# Patient Record
Sex: MALE | Race: WHITE | NOT HISPANIC OR LATINO | Employment: FULL TIME | URBAN - METROPOLITAN AREA
[De-identification: names, ages, dates, MRNs, and addresses within clinical notes are randomized per-mention and may not be internally consistent; named-entity substitution may affect disease eponyms.]

---

## 2017-02-21 ENCOUNTER — AMBULATORY - HEALTHEAST (OUTPATIENT)
Dept: ONCOLOGY | Facility: HOSPITAL | Age: 37
End: 2017-02-21

## 2017-05-30 ENCOUNTER — OFFICE VISIT - HEALTHEAST (OUTPATIENT)
Dept: FAMILY MEDICINE | Facility: CLINIC | Age: 37
End: 2017-05-30

## 2017-05-30 DIAGNOSIS — R05.9 COUGH: ICD-10-CM

## 2017-05-30 DIAGNOSIS — J20.9 ACUTE BRONCHITIS: ICD-10-CM

## 2017-05-30 DIAGNOSIS — R50.9 FEVER: ICD-10-CM

## 2017-05-30 DIAGNOSIS — J06.9 VIRAL URI: ICD-10-CM

## 2017-05-30 DIAGNOSIS — R07.0 THROAT PAIN: ICD-10-CM

## 2017-07-17 ENCOUNTER — OFFICE VISIT - HEALTHEAST (OUTPATIENT)
Dept: FAMILY MEDICINE | Facility: CLINIC | Age: 37
End: 2017-07-17

## 2017-07-17 DIAGNOSIS — R19.7 DIARRHEA OF PRESUMED INFECTIOUS ORIGIN: ICD-10-CM

## 2017-07-17 RX ORDER — LORAZEPAM 0.5 MG/1
1 TABLET ORAL 3 TIMES DAILY
Status: SHIPPED | COMMUNITY
Start: 2017-07-17 | End: 2021-12-15

## 2017-07-17 RX ORDER — VENLAFAXINE 75 MG/1
75 TABLET ORAL 2 TIMES DAILY
Status: SHIPPED | COMMUNITY
Start: 2017-07-17 | End: 2022-05-06

## 2017-07-17 ASSESSMENT — MIFFLIN-ST. JEOR: SCORE: 1914.6

## 2017-07-18 ENCOUNTER — COMMUNICATION - HEALTHEAST (OUTPATIENT)
Dept: FAMILY MEDICINE | Facility: CLINIC | Age: 37
End: 2017-07-18

## 2017-07-20 ENCOUNTER — COMMUNICATION - HEALTHEAST (OUTPATIENT)
Dept: FAMILY MEDICINE | Facility: CLINIC | Age: 37
End: 2017-07-20

## 2017-07-20 ENCOUNTER — OFFICE VISIT - HEALTHEAST (OUTPATIENT)
Dept: FAMILY MEDICINE | Facility: CLINIC | Age: 37
End: 2017-07-20

## 2017-07-20 DIAGNOSIS — R19.7 DIARRHEA: ICD-10-CM

## 2017-07-20 DIAGNOSIS — K76.89 OTHER CHRONIC NONALCOHOLIC LIVER DISEASE: ICD-10-CM

## 2017-07-20 ASSESSMENT — MIFFLIN-ST. JEOR: SCORE: 1904.16

## 2017-07-21 ENCOUNTER — HOSPITAL ENCOUNTER (OUTPATIENT)
Dept: ULTRASOUND IMAGING | Facility: HOSPITAL | Age: 37
Discharge: HOME | End: 2017-07-21
Attending: FAMILY MEDICINE

## 2017-07-21 DIAGNOSIS — R19.7 DIARRHEA: ICD-10-CM

## 2017-07-21 DIAGNOSIS — K76.89 OTHER CHRONIC NONALCOHOLIC LIVER DISEASE: ICD-10-CM

## 2017-07-27 ENCOUNTER — COMMUNICATION - HEALTHEAST (OUTPATIENT)
Dept: FAMILY MEDICINE | Facility: CLINIC | Age: 37
End: 2017-07-27

## 2017-07-28 ENCOUNTER — OFFICE VISIT - HEALTHEAST (OUTPATIENT)
Dept: FAMILY MEDICINE | Facility: CLINIC | Age: 37
End: 2017-07-28

## 2017-07-28 DIAGNOSIS — K82.8 GALLBLADDER SLUDGE: ICD-10-CM

## 2017-07-28 DIAGNOSIS — K76.89 OTHER CHRONIC NONALCOHOLIC LIVER DISEASE: ICD-10-CM

## 2017-10-04 ENCOUNTER — RECORDS - HEALTHEAST (OUTPATIENT)
Dept: ADMINISTRATIVE | Facility: OTHER | Age: 37
End: 2017-10-04

## 2017-10-27 ENCOUNTER — RECORDS - HEALTHEAST (OUTPATIENT)
Dept: ADMINISTRATIVE | Facility: OTHER | Age: 37
End: 2017-10-27

## 2017-11-06 ENCOUNTER — COMMUNICATION - HEALTHEAST (OUTPATIENT)
Dept: FAMILY MEDICINE | Facility: CLINIC | Age: 37
End: 2017-11-06

## 2017-11-06 DIAGNOSIS — Z23 NEED FOR HEPATITIS B VACCINATION: ICD-10-CM

## 2018-03-07 ENCOUNTER — OFFICE VISIT - HEALTHEAST (OUTPATIENT)
Dept: FAMILY MEDICINE | Facility: CLINIC | Age: 38
End: 2018-03-07

## 2018-03-07 DIAGNOSIS — R10.32 GROIN PAIN, LEFT: ICD-10-CM

## 2018-03-07 RX ORDER — DEXTROAMPHETAMINE SACCHARATE, AMPHETAMINE ASPARTATE, DEXTROAMPHETAMINE SULFATE AND AMPHETAMINE SULFATE 2.5; 2.5; 2.5; 2.5 MG/1; MG/1; MG/1; MG/1
10 TABLET ORAL 3 TIMES DAILY
Status: SHIPPED | COMMUNITY
Start: 2018-03-07 | End: 2021-12-15

## 2018-04-24 ENCOUNTER — OFFICE VISIT - HEALTHEAST (OUTPATIENT)
Dept: FAMILY MEDICINE | Facility: CLINIC | Age: 38
End: 2018-04-24

## 2018-04-24 DIAGNOSIS — J40 BRONCHITIS: ICD-10-CM

## 2018-04-24 ASSESSMENT — MIFFLIN-ST. JEOR: SCORE: 1929.39

## 2018-04-26 ENCOUNTER — OFFICE VISIT - HEALTHEAST (OUTPATIENT)
Dept: FAMILY MEDICINE | Facility: CLINIC | Age: 38
End: 2018-04-26

## 2018-04-26 DIAGNOSIS — R00.0 TACHYCARDIA: ICD-10-CM

## 2018-04-26 DIAGNOSIS — R05.9 COUGH: ICD-10-CM

## 2018-04-26 DIAGNOSIS — R09.02 HYPOXIA: ICD-10-CM

## 2018-04-26 DIAGNOSIS — R07.81 PLEURITIC CHEST PAIN: ICD-10-CM

## 2018-04-26 ASSESSMENT — MIFFLIN-ST. JEOR: SCORE: 1924.29

## 2018-05-08 ENCOUNTER — COMMUNICATION - HEALTHEAST (OUTPATIENT)
Dept: FAMILY MEDICINE | Facility: CLINIC | Age: 38
End: 2018-05-08

## 2018-05-14 ENCOUNTER — OFFICE VISIT - HEALTHEAST (OUTPATIENT)
Dept: FAMILY MEDICINE | Facility: CLINIC | Age: 38
End: 2018-05-14

## 2018-05-14 DIAGNOSIS — J20.9 ACUTE BRONCHITIS: ICD-10-CM

## 2018-07-09 ENCOUNTER — OFFICE VISIT - HEALTHEAST (OUTPATIENT)
Dept: FAMILY MEDICINE | Facility: CLINIC | Age: 38
End: 2018-07-09

## 2018-07-09 DIAGNOSIS — Z00.00 ROUTINE GENERAL MEDICAL EXAMINATION AT A HEALTH CARE FACILITY: ICD-10-CM

## 2018-07-09 DIAGNOSIS — E78.5 HYPERLIPIDEMIA: ICD-10-CM

## 2018-07-09 DIAGNOSIS — D69.6 THROMBOCYTOPENIA (H): ICD-10-CM

## 2018-07-09 DIAGNOSIS — K76.0 FATTY LIVER: ICD-10-CM

## 2018-07-09 DIAGNOSIS — Z23 NEED FOR VACCINATION: ICD-10-CM

## 2018-07-09 DIAGNOSIS — Z13.220 LIPID SCREENING: ICD-10-CM

## 2018-07-09 LAB
ALBUMIN SERPL-MCNC: 4.2 G/DL (ref 3.5–5)
ALP SERPL-CCNC: 60 U/L (ref 45–120)
ALT SERPL W P-5'-P-CCNC: 63 U/L (ref 0–45)
ANION GAP SERPL CALCULATED.3IONS-SCNC: 11 MMOL/L (ref 5–18)
AST SERPL W P-5'-P-CCNC: 34 U/L (ref 0–40)
BILIRUB SERPL-MCNC: 0.4 MG/DL (ref 0–1)
BUN SERPL-MCNC: 13 MG/DL (ref 8–22)
CALCIUM SERPL-MCNC: 9.7 MG/DL (ref 8.5–10.5)
CHLORIDE BLD-SCNC: 107 MMOL/L (ref 98–107)
CHOLEST SERPL-MCNC: 193 MG/DL
CO2 SERPL-SCNC: 24 MMOL/L (ref 22–31)
CREAT SERPL-MCNC: 0.79 MG/DL (ref 0.7–1.3)
ERYTHROCYTE [DISTWIDTH] IN BLOOD BY AUTOMATED COUNT: 12.4 % (ref 11–14.5)
FASTING STATUS PATIENT QL REPORTED: YES
GFR SERPL CREATININE-BSD FRML MDRD: >60 ML/MIN/1.73M2
GLUCOSE BLD-MCNC: 92 MG/DL (ref 70–125)
HBA1C MFR BLD: 5.4 % (ref 3.5–6)
HCT VFR BLD AUTO: 43.1 % (ref 40–54)
HDLC SERPL-MCNC: 39 MG/DL
HGB BLD-MCNC: 14.1 G/DL (ref 14–18)
LDLC SERPL CALC-MCNC: 94 MG/DL
MCH RBC QN AUTO: 29.1 PG (ref 27–34)
MCHC RBC AUTO-ENTMCNC: 32.7 G/DL (ref 32–36)
MCV RBC AUTO: 89 FL (ref 80–100)
PLATELET # BLD AUTO: 124 THOU/UL (ref 140–440)
PMV BLD AUTO: 10.4 FL (ref 7–10)
POTASSIUM BLD-SCNC: 4.7 MMOL/L (ref 3.5–5)
PROT SERPL-MCNC: 7.8 G/DL (ref 6–8)
RBC # BLD AUTO: 4.84 MILL/UL (ref 4.4–6.2)
SODIUM SERPL-SCNC: 142 MMOL/L (ref 136–145)
TRIGL SERPL-MCNC: 302 MG/DL
WBC: 7.6 THOU/UL (ref 4–11)

## 2018-07-09 ASSESSMENT — MIFFLIN-ST. JEOR: SCORE: 1934.1

## 2018-09-26 ENCOUNTER — COMMUNICATION - HEALTHEAST (OUTPATIENT)
Dept: FAMILY MEDICINE | Facility: CLINIC | Age: 38
End: 2018-09-26

## 2018-10-11 ENCOUNTER — AMBULATORY - HEALTHEAST (OUTPATIENT)
Dept: NURSING | Facility: CLINIC | Age: 38
End: 2018-10-11

## 2019-01-20 ENCOUNTER — COMMUNICATION - HEALTHEAST (OUTPATIENT)
Dept: FAMILY MEDICINE | Facility: CLINIC | Age: 39
End: 2019-01-20

## 2019-06-18 ENCOUNTER — COMMUNICATION - HEALTHEAST (OUTPATIENT)
Dept: FAMILY MEDICINE | Facility: CLINIC | Age: 39
End: 2019-06-18

## 2019-07-29 ENCOUNTER — OFFICE VISIT - HEALTHEAST (OUTPATIENT)
Dept: FAMILY MEDICINE | Facility: CLINIC | Age: 39
End: 2019-07-29

## 2019-07-29 DIAGNOSIS — N50.89 SCROTAL FULLNESS: ICD-10-CM

## 2019-07-29 DIAGNOSIS — D69.6 THROMBOCYTOPENIA (H): ICD-10-CM

## 2019-07-29 DIAGNOSIS — K76.0 FATTY LIVER: ICD-10-CM

## 2019-07-29 DIAGNOSIS — J45.20 MILD INTERMITTENT ASTHMA WITHOUT COMPLICATION: ICD-10-CM

## 2019-07-29 DIAGNOSIS — F30.9 BIPOLAR I DISORDER, SINGLE MANIC EPISODE (H): ICD-10-CM

## 2019-07-29 DIAGNOSIS — Z13.220 LIPID SCREENING: ICD-10-CM

## 2019-07-29 DIAGNOSIS — R07.89 ATYPICAL CHEST PAIN: ICD-10-CM

## 2019-07-29 DIAGNOSIS — Z00.00 ROUTINE GENERAL MEDICAL EXAMINATION AT A HEALTH CARE FACILITY: ICD-10-CM

## 2019-07-29 LAB
ALBUMIN SERPL-MCNC: 4.6 G/DL (ref 3.5–5)
ALP SERPL-CCNC: 69 U/L (ref 45–120)
ALT SERPL W P-5'-P-CCNC: 57 U/L (ref 0–45)
ANION GAP SERPL CALCULATED.3IONS-SCNC: 11 MMOL/L (ref 5–18)
AST SERPL W P-5'-P-CCNC: 29 U/L (ref 0–40)
ATRIAL RATE - MUSE: 91 BPM
BILIRUB SERPL-MCNC: 0.4 MG/DL (ref 0–1)
BUN SERPL-MCNC: 18 MG/DL (ref 8–22)
CALCIUM SERPL-MCNC: 9.9 MG/DL (ref 8.5–10.5)
CHLORIDE BLD-SCNC: 106 MMOL/L (ref 98–107)
CHOLEST SERPL-MCNC: 202 MG/DL
CO2 SERPL-SCNC: 22 MMOL/L (ref 22–31)
CREAT SERPL-MCNC: 0.96 MG/DL (ref 0.7–1.3)
DIASTOLIC BLOOD PRESSURE - MUSE: NORMAL MMHG
ERYTHROCYTE [DISTWIDTH] IN BLOOD BY AUTOMATED COUNT: 11.7 % (ref 11–14.5)
FASTING STATUS PATIENT QL REPORTED: YES
GFR SERPL CREATININE-BSD FRML MDRD: >60 ML/MIN/1.73M2
GLUCOSE BLD-MCNC: 93 MG/DL (ref 70–125)
HBA1C MFR BLD: 5.1 % (ref 3.5–6)
HCT VFR BLD AUTO: 44.5 % (ref 40–54)
HDLC SERPL-MCNC: 46 MG/DL
HGB BLD-MCNC: 15 G/DL (ref 14–18)
INTERPRETATION ECG - MUSE: NORMAL
LDLC SERPL CALC-MCNC: 119 MG/DL
MCH RBC QN AUTO: 30.1 PG (ref 27–34)
MCHC RBC AUTO-ENTMCNC: 33.7 G/DL (ref 32–36)
MCV RBC AUTO: 89 FL (ref 80–100)
P AXIS - MUSE: 62 DEGREES
PLATELET # BLD AUTO: 145 THOU/UL (ref 140–440)
PMV BLD AUTO: 10.9 FL (ref 7–10)
POTASSIUM BLD-SCNC: 4.2 MMOL/L (ref 3.5–5)
PR INTERVAL - MUSE: 136 MS
PROT SERPL-MCNC: 8 G/DL (ref 6–8)
QRS DURATION - MUSE: 88 MS
QT - MUSE: 376 MS
QTC - MUSE: 462 MS
R AXIS - MUSE: 38 DEGREES
RBC # BLD AUTO: 4.97 MILL/UL (ref 4.4–6.2)
SODIUM SERPL-SCNC: 139 MMOL/L (ref 136–145)
SYSTOLIC BLOOD PRESSURE - MUSE: NORMAL MMHG
T AXIS - MUSE: 64 DEGREES
TRIGL SERPL-MCNC: 183 MG/DL
VENTRICULAR RATE- MUSE: 91 BPM
WBC: 7.2 THOU/UL (ref 4–11)

## 2019-07-29 ASSESSMENT — MIFFLIN-ST. JEOR: SCORE: 1878.77

## 2019-08-23 ENCOUNTER — COMMUNICATION - HEALTHEAST (OUTPATIENT)
Dept: FAMILY MEDICINE | Facility: CLINIC | Age: 39
End: 2019-08-23

## 2019-10-04 ENCOUNTER — AMBULATORY - HEALTHEAST (OUTPATIENT)
Dept: NURSING | Facility: CLINIC | Age: 39
End: 2019-10-04

## 2019-10-14 ENCOUNTER — COMMUNICATION - HEALTHEAST (OUTPATIENT)
Dept: FAMILY MEDICINE | Facility: CLINIC | Age: 39
End: 2019-10-14

## 2019-11-22 ENCOUNTER — RECORDS - HEALTHEAST (OUTPATIENT)
Dept: ADMINISTRATIVE | Facility: OTHER | Age: 39
End: 2019-11-22

## 2019-12-26 ENCOUNTER — COMMUNICATION - HEALTHEAST (OUTPATIENT)
Dept: HEALTH INFORMATION MANAGEMENT | Facility: CLINIC | Age: 39
End: 2019-12-26

## 2020-09-24 ENCOUNTER — COMMUNICATION - HEALTHEAST (OUTPATIENT)
Dept: SCHEDULING | Facility: CLINIC | Age: 40
End: 2020-09-24

## 2020-09-24 ENCOUNTER — OFFICE VISIT - HEALTHEAST (OUTPATIENT)
Dept: FAMILY MEDICINE | Facility: CLINIC | Age: 40
End: 2020-09-24

## 2020-09-24 ENCOUNTER — COMMUNICATION - HEALTHEAST (OUTPATIENT)
Dept: FAMILY MEDICINE | Facility: CLINIC | Age: 40
End: 2020-09-24

## 2020-09-24 DIAGNOSIS — S61.012D LACERATION OF LEFT THUMB WITHOUT FOREIGN BODY WITHOUT DAMAGE TO NAIL, SUBSEQUENT ENCOUNTER: ICD-10-CM

## 2020-09-24 DIAGNOSIS — M79.642 PAIN OF LEFT HAND: ICD-10-CM

## 2020-09-24 RX ORDER — ONDANSETRON 4 MG/1
4-8 TABLET, ORALLY DISINTEGRATING ORAL EVERY 8 HOURS PRN
Qty: 15 TABLET | Refills: 0 | Status: SHIPPED | OUTPATIENT
Start: 2020-09-24 | End: 2021-09-29

## 2020-09-24 RX ORDER — HYDROCODONE BITARTRATE AND ACETAMINOPHEN 5; 325 MG/1; MG/1
1 TABLET ORAL EVERY 4 HOURS PRN
Qty: 12 TABLET | Refills: 0 | Status: SHIPPED | OUTPATIENT
Start: 2020-09-24 | End: 2021-09-29

## 2020-09-30 ENCOUNTER — RECORDS - HEALTHEAST (OUTPATIENT)
Dept: ADMINISTRATIVE | Facility: OTHER | Age: 40
End: 2020-09-30

## 2021-01-15 ENCOUNTER — COMMUNICATION - HEALTHEAST (OUTPATIENT)
Dept: FAMILY MEDICINE | Facility: CLINIC | Age: 41
End: 2021-01-15

## 2021-02-05 ENCOUNTER — COMMUNICATION - HEALTHEAST (OUTPATIENT)
Dept: FAMILY MEDICINE | Facility: CLINIC | Age: 41
End: 2021-02-05

## 2021-05-12 ENCOUNTER — RECORDS - HEALTHEAST (OUTPATIENT)
Dept: ADMINISTRATIVE | Facility: OTHER | Age: 41
End: 2021-05-12

## 2021-05-30 VITALS — BODY MASS INDEX: 32.57 KG/M2 | WEIGHT: 227 LBS

## 2021-05-30 NOTE — PROGRESS NOTES
Assessment:     1. Routine general medical examination at a health care facility    2. Atypical chest pain    3. Scrotal fullness    4. Fatty liver    5. Thrombocytopenia (H)    6. Bipolar I disorder, single manic episode (H)    7. Mild intermittent asthma without complication    8. Lipid screening        Plan:      1. Routine general medical examination at a health care facility  -Routine health maintenance discussion:  No smoking, limited alcohol (7 or less servings per week), 5 fruits/veg servings per day, 200 minutes of exercise per week.  Daily calcium/vitamin D guidelines, bone health, colon cancer screening beginning at age 50.  Accident avoidance, sun screen.     2. Atypical chest pain  -Discussed possible etiologies including coronary disease given his age and gender as well as heartburn given the times that he is bothered with his symptoms.  Given that consistent lack of symptoms with exercise and the fact that he has symptoms with eating he is going to work on controlling his diet first.  If this is not helping he will let me know or if his symptoms worsen or become more consistent with exercise and we can refer him for stress test.  EKG is unremarkable today.  - Electrocardiogram Perform - Clinic    3. Scrotal fullness  -Exam is consistent with very sensitive scrotum, no obvious testicular abnormalities.  Discussed that the exam was not very thorough however given the sensitivity.  Given the fact that this is relatively new he will watch this for the next few weeks and if not improving he will let me know and we could refer to urology or for a scrotal ultrasound for further evaluation and assessment.    4. Fatty liver  -Updating labs as listed below.  He does follow with gastroenterology for monitoring.  - Glycosylated Hemoglobin A1c  - Comprehensive Metabolic Panel    5. Thrombocytopenia (H)  -Did have some thrombocytopenia with his last lab work, updating levels today.  - HM2(CBC w/o  Differential)    6. Bipolar I disorder, single manic episode (H)  -[facility]  He is going to get a private consultation however medications he will let me know if they change anything.    7. Mild intermittent asthma without complication  -Symptoms are under good control.  He takes albuterol only very rarely.  Updated asthma action plan and asthma control test today.    8. Lipid screening  - Lipid Cascade       Subjective:      Jack Logan is a 38 y.o. male who presents for an annual exam. The patient reports that there is not domestic violence in his life.     He does note some pain in his left chest at times. Does seem to happen with exertion but does happen without exertion as well. It starts as a sharp shooting pain, then gets numb and then will go away. It can last a few seconds to a minute. He does get short of breath due to what it feels like. He doesn't get heartburn. It is sporadic in nature.  He notes that it will happen at times after he goes out to eat or has a big meal.  Again it is fairly fleeting in nature.    [facility] He is going to a private consult to have them evaluate his medications.     His asthma seems great. He maybe uses the inhaler maybe one time a month. He will need it when he skips allergy medications or allergens are higher.     He does have some scrotal tightness. There is some pain/pressure. He has had no injury. This is on both sides. There is some tenderness. It isn't getting worse but not going away. No new bulging in the groin. He does feel  liek a water balloon sensation.     Healthy Habits:   Regular Exercise: Yes - could be better.2x per week  Sunscreen Use: Yes  Healthy Diet: Yes - could be better.   Dental Visits Regularly: Yes - could be better.   Seat Belt: Yes  Sexually active: Yes  Monthly Self Testicular Exams:  No  Hemoccults: N/A  Flex Sig: N/A  Colonoscopy: N/A  Lipid Profile: Yes  Glucose Screen:  Yes  Prevention of Osteoporosis: No  Last Dexa: N/A  Guns at Home:  No      Immunization History   Administered Date(s) Administered     DT (pediatric) 01/01/1998     Hep A, historic 08/06/2007, 06/16/2010     Hep B, Adult 07/09/2018     Influenza, Seasonal, Inj PF IIV3 10/06/2010, 09/23/2013     Influenza, inj, historic,unspecified 01/09/2008, 10/18/2008     Influenza, seasonal,quad inj 36+ mos 10/13/2015, 10/25/2016, 10/11/2018     Td, adult adsorbed, PF 07/09/2018     Td,adult,historic,unspecified 01/01/1998     Tdap 08/06/2007     Immunization status: up to date and documented.    No exam data present     Current Outpatient Medications   Medication Sig Dispense Refill     albuterol (PROAIR HFA;PROVENTIL HFA;VENTOLIN HFA) 90 mcg/actuation inhaler Inhale 2 puffs every 4 (four) hours as needed for wheezing. 1 Inhaler 0     buPROPion (WELLBUTRIN) 100 MG tablet Take 100 mg by mouth 2 (two) times a day.       cetirizine (ZYRTEC) 10 MG tablet Take 10 mg by mouth daily.       desonide (DESOWEN) 0.05 % ointment Apply topically 2 (two) times a day.       dextroamphetamine-amphetamine (ADDERALL) 10 mg Tab tablet Take 10 mg by mouth 3 (three) times a day.       FLUoxetine (PROZAC) 20 MG capsule Take 20 mg by mouth daily.       fluticasone (FLONASE) 50 mcg/actuation nasal spray 1 spray into each nostril daily.       ketoconazole (NIZORAL) 2 % cream Apply topically daily.       LORazepam (ATIVAN) 0.5 MG tablet Take 1 mg by mouth 3 (three) times a day.       traZODone (DESYREL) 50 MG tablet Take 50 mg by mouth bedtime.       venlafaxine (EFFEXOR) 75 MG tablet Take 75 mg by mouth 2 (two) times a day.       No current facility-administered medications for this visit.      Past Medical History:   Diagnosis Date     ADHD (attention deficit hyperactivity disorder)      Allergic rhinitis, cause unspecified      Anxiety      Asthma      Cystic fibrosis carrier      Depression      Hearing loss      Obesity      Onychomycosis       Past Surgical History:   Procedure Laterality Date     APPENDECTOMY       atypical nevus       AR APPENDECTOMY      Description: Appendectomy;  Recorded: 11/20/2008;     WISDOM TOOTH EXTRACTION       Patient has no known allergies.  Family History   Problem Relation Age of Onset     Cancer Father 52        nasopharyngeal     Pancreatic cancer Paternal Aunt         diagnosed in her 50s-60s     Parkinsonism Maternal Grandmother      Glaucoma Paternal Grandmother      Pancreatic cancer Paternal Grandmother         diagnosed in her 60s-70s     Melanoma Paternal Grandmother         diagnosed in her 60s-70s     Autism spectrum disorder Mother      Cancer Paternal Uncle         nasopharyngeal cancer, diagnosed in his 50s-60s     Pancreatic cancer Paternal Grandfather         possible diagnosis     Cystic fibrosis Daughter      Colon cancer Cousin         paternal first-cousin, prior to age 50     Leukemia Cousin         paternal first-cousin, in childhood     Cancer Other         great-uncle (paternal grandmother's brother), unknown type     Stomach cancer Other         great-aunt (paternal grandmother's sister)     Social History     Socioeconomic History     Marital status:      Spouse name: Not on file     Number of children: Not on file     Years of education: Not on file     Highest education level: Not on file   Occupational History     Not on file   Social Needs     Financial resource strain: Not on file     Food insecurity:     Worry: Not on file     Inability: Not on file     Transportation needs:     Medical: Not on file     Non-medical: Not on file   Tobacco Use     Smoking status: Never Smoker     Smokeless tobacco: Never Used   Substance and Sexual Activity     Alcohol use: Yes     Alcohol/week: 1.5 oz     Types: 3 Standard drinks or equivalent per week     Comment: Social      Drug use: No     Sexual activity: Yes     Partners: Female   Lifestyle     Physical activity:     Days per week: Not on  "file     Minutes per session: Not on file     Stress: Not on file   Relationships     Social connections:     Talks on phone: Not on file     Gets together: Not on file     Attends Sikhism service: Not on file     Active member of club or organization: Not on file     Attends meetings of clubs or organizations: Not on file     Relationship status: Not on file     Intimate partner violence:     Fear of current or ex partner: Not on file     Emotionally abused: Not on file     Physically abused: Not on file     Forced sexual activity: Not on file   Other Topics Concern     Not on file   Social History Narrative     Not on file       Review of Systems  Review of Systems      With the exception of the aforementioned issues, 12 point, comprehensive ROS was done and was negative.     Objective:     Vitals:    07/29/19 0809   BP: 108/68   Pulse: 100   SpO2: 98%   Weight: 215 lb 1.6 oz (97.6 kg)   Height: 5' 9.8\" (1.773 m)     Body mass index is 31.04 kg/m .    Physical  Physical Exam       Well developed, well nourished, no acute distress.  HEENT: normocephalic/atraumatic, PERRLA/EOMI, TMs: Gray, normal light reflex, no nasal discharge.  Oral mucosa: no erythema/exudate  Neck: No LAD/masses/thyromegaly  Lungs: clear bilaterally  Heart: regular rate and rhythm, no murmurs/gallops/rubs  Abdomen: Normal bowel sounds, soft, non-tender, non-distended, no masses, neg Perry's/McBurney's, no rebound/guarding  Genital: External genitalia appears unremarkable, testicular exam is difficult to do due to sensitivity but no obvious abnormalities, significant scrotal sensitivity however  Lymphatics: no supraclavicular LAD. No edema.  Neuro: A&O x 3, CN II-XII intact, strength 5/5, reflexes symmetric, sensory intact to light touch.  Psych: Behavior appropriate, engaging.  Thought processes congruent, non-tangential.  Affect does appear somewhat anxious  Musculoskeletal: no gross deformities.  Skin: no rashes or lesions.    Results for " orders placed or performed in visit on 07/29/19   Glycosylated Hemoglobin A1c   Result Value Ref Range    Hemoglobin A1c 5.1 3.5 - 6.0 %   Lipid Cascade   Result Value Ref Range    Cholesterol 202 (H) <=199 mg/dL    Triglycerides 183 (H) <=149 mg/dL    HDL Cholesterol 46 >=40 mg/dL    LDL Calculated 119 <=129 mg/dL    Patient Fasting > 8hrs? Yes    Comprehensive Metabolic Panel   Result Value Ref Range    Sodium 139 136 - 145 mmol/L    Potassium 4.2 3.5 - 5.0 mmol/L    Chloride 106 98 - 107 mmol/L    CO2 22 22 - 31 mmol/L    Anion Gap, Calculation 11 5 - 18 mmol/L    Glucose 93 70 - 125 mg/dL    BUN 18 8 - 22 mg/dL    Creatinine 0.96 0.70 - 1.30 mg/dL    GFR MDRD Af Amer >60 >60 mL/min/1.73m2    GFR MDRD Non Af Amer >60 >60 mL/min/1.73m2    Bilirubin, Total 0.4 0.0 - 1.0 mg/dL    Calcium 9.9 8.5 - 10.5 mg/dL    Protein, Total 8.0 6.0 - 8.0 g/dL    Albumin 4.6 3.5 - 5.0 g/dL    Alkaline Phosphatase 69 45 - 120 U/L    AST 29 0 - 40 U/L    ALT 57 (H) 0 - 45 U/L   HM2(CBC w/o Differential)   Result Value Ref Range    WBC 7.2 4.0 - 11.0 thou/uL    RBC 4.97 4.40 - 6.20 mill/uL    Hemoglobin 15.0 14.0 - 18.0 g/dL    Hematocrit 44.5 40.0 - 54.0 %    MCV 89 80 - 100 fL    MCH 30.1 27.0 - 34.0 pg    MCHC 33.7 32.0 - 36.0 g/dL    RDW 11.7 11.0 - 14.5 %    Platelets 145 140 - 440 thou/uL    MPV 10.9 (H) 7.0 - 10.0 fL   Electrocardiogram Perform - Clinic   Result Value Ref Range    SYSTOLIC BLOOD PRESSURE  mmHg    DIASTOLIC BLOOD PRESSURE  mmHg    VENTRICULAR RATE 91 BPM    ATRIAL RATE 91 BPM    P-R INTERVAL 136 ms    QRS DURATION 88 ms    Q-T INTERVAL 376 ms    QTC CALCULATION (BEZET) 462 ms    P Axis 62 degrees    R AXIS 38 degrees    T AXIS 64 degrees    MUSE DIAGNOSIS       Normal sinus rhythm  Normal ECG  No previous ECGs available  Confirmed by ANTONIO ROGERS, JAMES LOC:JN ([id number]) on 7/29/2019 4:37:09 PM

## 2021-05-31 VITALS — WEIGHT: 220 LBS | HEIGHT: 70 IN | BODY MASS INDEX: 31.5 KG/M2

## 2021-05-31 VITALS — WEIGHT: 220.7 LBS | BODY MASS INDEX: 31.67 KG/M2

## 2021-05-31 VITALS — HEIGHT: 70 IN | BODY MASS INDEX: 31.82 KG/M2 | WEIGHT: 222.3 LBS

## 2021-05-31 VITALS — WEIGHT: 227.5 LBS | BODY MASS INDEX: 32.64 KG/M2

## 2021-05-31 NOTE — TELEPHONE ENCOUNTER
"Who is calling:  Patient  Reason for Call:  Patient would like to come into clinic today (08/23) to  a form called \"[facility] well physical report or employee wellness sheet\" that he states that his primary physician filled out and for some reason can't access on My-Chart- Patient is wanting to  this paperwork at [facility] as his daughter has appointment today in allergy care at 1:40.  Date of last appointment with primary care: none  Okay to leave a detailed message: Yes      "

## 2021-06-01 VITALS — BODY MASS INDEX: 31.57 KG/M2 | WEIGHT: 220 LBS

## 2021-06-01 VITALS — WEIGHT: 226.6 LBS | HEIGHT: 70 IN | BODY MASS INDEX: 32.44 KG/M2

## 2021-06-01 VITALS — BODY MASS INDEX: 32.13 KG/M2 | WEIGHT: 224.44 LBS | HEIGHT: 70 IN

## 2021-06-01 VITALS — BODY MASS INDEX: 32.29 KG/M2 | WEIGHT: 225.56 LBS | HEIGHT: 70 IN

## 2021-06-03 VITALS — HEIGHT: 70 IN | BODY MASS INDEX: 30.79 KG/M2 | WEIGHT: 215.1 LBS

## 2021-06-04 VITALS
HEART RATE: 94 BPM | WEIGHT: 205 LBS | OXYGEN SATURATION: 98 % | DIASTOLIC BLOOD PRESSURE: 71 MMHG | BODY MASS INDEX: 29.58 KG/M2 | SYSTOLIC BLOOD PRESSURE: 113 MMHG

## 2021-06-09 NOTE — PROGRESS NOTES
Jack emailed today seeking my opinion on [employer] analysis, which he read about online.  I sent the following response:    Scooter Santiago - [employer] is geared primarily toward testing to identify ethnic background and in some cases, locate DNA-matched relatives.  This testing (including any raw data obtained from the [employer] analysis) is not validated for the vast majority of markers that relate to cancer risk and is not intended for medical use.  If cancer risk is your primary concern, I would still defer to the testing we discussed previously which can range from about $260-$475.  You can find more information about one of those options here: [email or web address]  Let me know if you have any other questions or if you would like to pursue any of the cancer-related testing.

## 2021-06-10 NOTE — PROGRESS NOTES
ASSESSMENT/PLAN:   1. Acute bronchitis  benzonatate (TESSALON PERLES) 100 MG capsule   2. Throat pain  Rapid Strep A Screen-Throat    Group A Strep, RNA Direct Detection, Throat   3. Cough  albuterol nebulizer solution 3 mL (PROVENTIL)    Influenza A/B Rapid Test   4. Fever  Influenza A/B Rapid Test   5. Viral URI         Negative RST.  Patient symptoms are more characteristic of a viral illness, given associated cough and rhinorrhea.  I did test him for influenza given reports of fever and body aches.  This returned negative.   Given his complaint of cough and shortness of breath, I gave an albuterol nebulizer treatment in the clinic today.  Upon repeat lung exam, air movement improved slightly and there was no rales or crackles.  Oxygenation increased to 97% and patient did feel symptomatically better.  No indication for chest x-ray today as lungs were clear and he is afebrile and well-appearing here. Despite patient's complaints of chest pain, it was associated with cough and was reproducible upon palpation. Chest pain is highly likely to be musculoskeletal due to frequent coughing.  I think this is all likely a viral bronchitis. There is no clinical indication for steroids and I do not think this is a clinically significant asthma exacerbation without any wheezing and with good oxygenation. Discussed and prescribed symptomatic cares.    At the end of the encounter, I discussed results, diagnosis, medications. Discussed red flags for immediate return to clinic/ER, as well as indications for follow up if no improvement. Patient understood and agreed to plan. Patient was stable for discharge.      *I was masked during all patient interactions. Patient was also masked from time of registration.      Patient Instructions:  Patient Instructions   Your influenza test was negative.  Your strep throat test was negative today.  We will call you tomorrow only if the overnight strep test is positive.  Otherwise, no news is  "good news.    There were no signs of pneumonia on your lung exam.    Your symptoms are most likely due to a viral bronchitis in addition to a viral head cold.    Your lungs responded very nicely to the albuterol treatment here in the clinic.  Please take your albuterol inhaler 2 puffs every 4 hours for the next 5-7 days.  Wean as symptoms improve.    May take Tessalon Perles as needed for cough.     Use Mucinex twice daily for cough and congestion.    Please monitor symptoms carefully.  If developing chest pain, shortness of breath, fever not coming down with Tylenol/Motrin, coughing up blood, extreme fatigue, or any other new, concerning symptoms, come back to clinic or go to ER immediately.  Otherwise, if no improvement in symptoms in one week, follow-up with your primary care provider.                        SUBJECTIVE:   Jack Logan is a 36 y.o. male with a history of intermittent asthma, who presents today for evaluation of sore throat and fever x 3 days. He admits to pain with swallowing but is still tolerating oral liquid intake. He admits to associated headache, dizziness, nausea, runny nose, and cough. He has had subjective fevers at home for the last 3 days, nothing measured. He describes cough as \"wet and heavy\" and is productive of yellowish green mucous. He admits to feeling more short of breath, worse with exertion. He admits to wheezing and is using his albuterol every 4 hours due to this, which does help somewhat. He admits to central chest tightness present all the time but worse with coughing. No hemoptysis.   He describes the dizziness as a \"loss of balance\" with sudden movements like getting up or walking around. He admits to all over body aches. He admits to decreased appetite. No abdominal pain or vomiting. No diarrhea or bloody stools. No syncope or presyncope. No neck stiffness. No dysuria or hematuria. No rashes.  No known ill contacts.    Past Medical History:  Patient Active Problem " List   Diagnosis     Allergic Rhinitis     Anxiety     Hemorrhoids     Hyperlipidemia     Obesity     Intermittent Asthma     Fatty Liver     Bipolar Disorder     Candidal Intertrigo     Onychocryptosis     Ingrown toenail       Surgical History:  Past Surgical History:   Procedure Laterality Date     APPENDECTOMY       atypical nevus       AR APPENDECTOMY      Description: Appendectomy;  Recorded: 11/20/2008;     WISDOM TOOTH EXTRACTION           Family History:  Family History   Problem Relation Age of Onset     Cancer Father 52     nasopharyngeal     Pancreatic cancer Paternal Aunt      diagnosed in her 50s-60s     Parkinsonism Maternal Grandmother      Glaucoma Paternal Grandmother      Pancreatic cancer Paternal Grandmother      diagnosed in her 60s-70s     Melanoma Paternal Grandmother      diagnosed in her 60s-70s     Autism spectrum disorder Mother      Cancer Paternal Uncle      nasopharyngeal cancer, diagnosed in his 50s-60s     Pancreatic cancer Paternal Grandfather      possible diagnosis     Cystic fibrosis Daughter      Colon cancer Cousin      paternal first-cousin, prior to age 50     Leukemia Cousin      paternal first-cousin, in childhood     Cancer Other      great-uncle (paternal grandmother's brother), unknown type     Stomach cancer Other      great-aunt (paternal grandmother's sister)     Reviewed; Non-contributory      Social History:    History   Smoking Status     Never Smoker   Smokeless Tobacco     Never Used     Smoking: none  Alcohol use: monthly  Other drug use: none  Occupation: works in IT        Current Medications:  Current Outpatient Prescriptions on File Prior to Visit   Medication Sig Dispense Refill     buPROPion (WELLBUTRIN) 100 MG tablet Take 100 mg by mouth 2 (two) times a day.       cetirizine (ZYRTEC) 10 MG tablet Take 10 mg by mouth daily.       desonide (DESOWEN) 0.05 % ointment Apply topically 2 (two) times a day.       FLUoxetine (PROZAC) 20 MG capsule Take 20 mg by  mouth daily.       ketoconazole (NIZORAL) 2 % cream Apply topically daily.       traZODone (DESYREL) 50 MG tablet Take 50 mg by mouth bedtime.       No current facility-administered medications on file prior to visit.        Allergies:   No Known Allergies    I personally reviewed patient's past medical, surgical, social, family history and allergies.    ROS:  Review of Systems  A 12 point review of systems was conducted and otherwise negative unless noted in HPI.        OBJECTIVE:   /74 (Patient Site: Right Arm, Patient Position: Sitting, Cuff Size: Adult Regular)  Pulse 93  Temp 98.1  F (36.7  C) (Oral)   Resp 20  Wt (!) 227 lb (103 kg)  SpO2 97%  BMI 32.57 kg/m2      General Appearance:  Alert, nontoxic appearing male in NAD. Afebrile. Appears not to feel well.   Integument: Warm, dry, no diaphoresis or pallor.  HEENT:  Head: Atraumatic, normocephalic. Face nontraumatic.  Eyes: Conjunctiva clear, Lids normal.  Ears: TMs pearly, translucent bilaterally. No canal erythema or edema.  Nose: nares patent. ++ erythema of nasal mucosa. Moderate amount of light yellow rhinorrhea.  Oropharynx:  No trismus. ++ uvular and posterior pharyngeal erythema. + palatal petechiae. No tonsillar hypertrophy, no exudate. Uvula midline; no uvular edema. Moist mucus membranes.  Neck: Supple, no lymphadenopathy. No meningismus.  Respiratory: No distress. Adequate air flow. Slightly coarse breath sounds however no rales, rhonchi, or wheezes.  Cardiovascular: Regular rate and rhythm, no murmur, rub or gallop. No obvious chest wall deformities. Anterior chest wall is tender to palpation. No peripheral edema.  MSK: no calf tenderness.            Laboratory Studies:  I personally ordered and interpreted these studies.    Results for orders placed or performed in visit on 05/30/17   Rapid Strep A Screen-Throat   Result Value Ref Range    Rapid Strep A Antigen No Group A Strep detected, presumptive negative No Group A Strep  detected, presumptive negative   Influenza A/B Rapid Test   Result Value Ref Range    Influenza  A, Rapid Antigen No Influenza A antigen detected No Influenza A antigen detected    Influenza B, Rapid Antigen No Influenza B antigen detected No Influenza B antigen detected

## 2021-06-11 NOTE — PROGRESS NOTES
Assessment:     1. Pain of left hand  ondansetron (ZOFRAN-ODT) 4 MG disintegrating tablet    HYDROcodone-acetaminophen 5-325 mg per tablet   2. Laceration of left thumb without foreign body without damage to nail, subsequent encounter          Plan:          We reviewed the likely/potential etiology(ies) for his  pain symptoms and we will send Rx's for zofran prn and hydrocodone as needed for pain. We discussed the probability that the pain should settle down over then next 1-2 days. I did not unwrap the area because he was leary of any pressure on it, etc, but fingers were normal in coloring and appearance.  We reviewed use of OTC analgesics, ice and elevation as well, and he will proceed to the ER if symptoms get acutely worse or do not respond to the pain medication. He will otherwise follow up as needed.      Subjective:             Jack Logan is a 39 y.o. male who presents for evaluation of left hand/finger pain. Onset was sudden, related to a laceration requiring stitches. Mechanism of injury: cut thumb with a . He went to [facility] urgent care early this am and he began having significant pain after the local anesthetic wore off. The pain is severe and is relieved by nothing he has ried. He has tried otc advil and tylenol without relief. He has also tried icing and elevation and the pain continues to be severe. He has associated nausea and dizziness and feels better if he is moving. He cannot tolerate the pressure from ice. He walked in to the clinic today and was pacing at the  and loudly expressing his discomfort. He is standing in the exam room as he states that his pain is worse when he is sitting down.       The following portions of the patient's history were reviewed and updated as appropriate: allergies, current medications, past family history, past medical history, past social history, past surgical history and problem list.    Review of Systems  A 12 point comprehensive  review of systems was negative except as noted.     Objective:        Vitals:    09/24/20 0921 09/24/20 0922   BP: 111/80 113/71   Patient Position: Standing Standing   Cuff Size: Adult Regular Adult Regular   Pulse: 94    SpO2: 98%    Weight: 205 lb (93 kg)       Physical Exam:  GEN: Alert and oriented, NAD,  well nourished  SKIN:  Normal skin turgor, no lesions/rashes   HEENT: moist mucous membranes, no rhinorrhea.    NECK: Normal.  No adenopathy or thyromegaly.  CV: Regular rate and rhythm, no murmurs.   LUNGS: Clear to auscultation bilaterally.    ABDOMEN: Soft, non-tender, non-distended, no masses   EXTREMITY: No edema, cyanosis. Left hand is wrapped with an ace wrap. Distal CMS intact.   There is no discoloration or swelling of the fingers distally.  NEURO: Grossly normal.

## 2021-06-11 NOTE — PROGRESS NOTES
Assessment:  1.  Diarrhea, ongoing.    Plan: Check stool culture and another stool for ova and parasites.  Advised him to use Pedialyte only over the next 24 hours, then can try using Gatorade and try slow introduction of the brat type diet and if he is making progress to slow gradual advancement of his food intake.  But we will schedule him for diagnostic colonoscopy given how this is gone the last 3 weeks.  Also check CBC and hepatic profile.  He understands and agrees.  I explained to him that the differential includes a number of bacterial infections that he could have potentially gotten from the spoiled or poor milk including Salmonella, Campylobacter etc.  I explained that normally we do not use antibiotic to treat most GI infections but there are a few situations that could need antibiotic treatment.  Explained that we would not want to do empiric treatment with antibiotic care.  Explained that if he is making good progress or he find a definite etiology prior to the diagnostic colonoscopy, that can always be canceled.  He notes that his family had scheduled to go on a trip to a cabin in [place] this coming Sunday for several days.  Certainly if he has any fever or vomiting or worsening diarrhea, he should not go and also should be seen in any worsening.  If he does have improvement he could be very careful with his diet and just use good handwashing etc. in any case.    Addendum: Patient basically demands that he have a gallbladder ultrasound done.  Given his current symptoms and his history of fatty liver it is not inappropriate to do.  We will put in order.    Subjective: 36-year-old male presenting for further evaluation of diarrhea.  He has not had any recent past exposure to lake water or well water recently.  Notes that about 3 weeks ago he had stopped to get some milk when he was in [place] at a gas station and drank some milk in order to be able to take a protein supplement that he uses.  Then  about 2 hours later he had the abrupt onset of vomiting and GI symptoms.  His wife later said that the milk smelt bad.  No one else drank it.  He had significant diarrhea occur but then he did seem to improve over the next week but then 1 week after onset of his symptoms he had vomiting again and diarrhea flared up.  He has been having at least 3-5 watery stools per day, no blood, no fever.  He was seen on Monday at the [facility].  See that note and the lab tests ordered.  He has a history of fatty liver.  He thought they were going to do liver function tests.  See his last physical with Dr. Ponce last November.  He was told to use Imodium and he did take 2 pills on the first day and then 1 pill a day since then.  He notes that the cramping and nausea are better but he still has the diarrhea.  Past Medical History:   Diagnosis Date     ADHD (attention deficit hyperactivity disorder)      Allergic rhinitis, cause unspecified      Anxiety      Asthma      Cystic fibrosis carrier      Depression      Hearing loss      Obesity      Onychomycosis      No Known Allergies  Current Outpatient Prescriptions   Medication Sig Dispense Refill     benzonatate (TESSALON PERLES) 100 MG capsule Take 1 capsule (100 mg total) by mouth every 6 (six) hours as needed for cough. 30 capsule 0     buPROPion (WELLBUTRIN) 100 MG tablet Take 100 mg by mouth 2 (two) times a day.       cetirizine (ZYRTEC) 10 MG tablet Take 10 mg by mouth daily.       desonide (DESOWEN) 0.05 % ointment Apply topically 2 (two) times a day.       FLUoxetine (PROZAC) 20 MG capsule Take 20 mg by mouth daily.       ketoconazole (NIZORAL) 2 % cream Apply topically daily.       LORazepam (ATIVAN) 0.5 MG tablet Take 1 mg by mouth 3 (three) times a day.       traZODone (DESYREL) 50 MG tablet Take 50 mg by mouth bedtime.       venlafaxine (EFFEXOR) 75 MG tablet Take 75 mg by mouth 2 (two) times a day.       No current facility-administered medications for this  "visit.      All other review of systems are currently negative.    Objective:/70  Pulse 65  Temp 97.7  F (36.5  C)  Resp 18  Ht 5' 10\" (1.778 m)  Wt 220 lb (99.8 kg)  SpO2 97%  BMI 31.57 kg/m2  HEENT examination is negative.  Neck supple.  Lungs clear.  Heart regular rate and rhythm without murmur.  Abdomen shows no masses tenderness or hepatosplenomegaly.  No pedal edema.  He is alert with clear speech.  Recent Results (from the past 240 hour(s))   Basic Metabolic Panel   Result Value Ref Range    Sodium 141 136 - 145 mmol/L    Potassium 4.3 3.5 - 5.0 mmol/L    Chloride 107 98 - 107 mmol/L    CO2 21 (L) 22 - 31 mmol/L    Anion Gap, Calculation 13 5 - 18 mmol/L    Glucose 80 70 - 125 mg/dL    Calcium 9.4 8.5 - 10.5 mg/dL    BUN 10 8 - 22 mg/dL    Creatinine 0.92 0.70 - 1.30 mg/dL    GFR MDRD Af Amer >60 >60 mL/min/1.73m2    GFR MDRD Non Af Amer >60 >60 mL/min/1.73m2   C. difficile Toxigenic by PCR   Result Value Ref Range    C.Difficile Toxigenic by PCR Negative Negative   Ova and Parasite, Stool   Result Value Ref Range    Ova + Parasite Exam No ova or parasites seen No Ova or Parasites seen     "

## 2021-06-11 NOTE — PROGRESS NOTES
Assessment:   1. Diarrhea of presumed infectious origin   Diarrhea of uncertain etiology, moderate in severity  - C. difficile Toxigenic by PCR  - Ova and Parasite, Stool  - H. pylori Antibody, IgG  - Basic Metabolic Panel  - Occult Blood(ICT)    Plan:   Clear liquids for 1 days.  Discussed the appropriate management of diarrhea.  Follow up with PCP in 7 days or as needed.  Lab studies per orders.  OTC antidiarrheals may be used judiciously.  Stool studies per orders.     Subjective:   Jack Logan is a 36 y.o. male who presents for evaluation of diarrhea. Onset of diarrhea was 2 weeks ago.  Patient reported that 2 weeks ago he stopped at a gas station he picked up a gallon of milk and he drank the milk. When he got home that day he was having abdominal cramping and diarrhea.  He stated that his wife noticd that the milk was sour and she threw it away.  Patient thought that his diarrhea and stomach upset was due to the milk he drank but the diarrhea lasted for about a week.  When he thought that the diarrhea had stopped, it started again this past week and has been going on for another week.  He stated that every time he eats anything he gets loose stool.  Patient stated that he tried going on liquid diet for 2 days and also went on bland diet of crackers, toast, white rice but he is still having diarrhea, bloating, and nausea.  Patient denied black stool, blood in the stool, mucus in his stool.      The following portions of the patient's history were reviewed and updated as appropriate:   He  has a past medical history of ADHD (attention deficit hyperactivity disorder); Allergic rhinitis, cause unspecified; Anxiety; Asthma; Cystic fibrosis carrier; Depression; Hearing loss; Obesity; and Onychomycosis.  He  does not have any pertinent problems on file.  He  has a past surgical history that includes appendectomy; Appendectomy; atypical nevus; and [place] tooth extraction.  His family history includes Autism  spectrum disorder in his mother; Cancer in his other and paternal uncle; Cancer (age of onset: 52) in his father; Colon cancer in his cousin; Cystic fibrosis in his daughter; Glaucoma in his paternal grandmother; Leukemia in his cousin; Melanoma in his paternal grandmother; Pancreatic cancer in his paternal aunt, paternal grandfather, and paternal grandmother; Parkinsonism in his maternal grandmother; Stomach cancer in his other.  He  reports that he has never smoked. He has never used smokeless tobacco. He reports that he drinks about 1.5 oz of alcohol per week  He reports that he does not use illicit drugs.  Current Outpatient Prescriptions   Medication Sig Dispense Refill     cetirizine (ZYRTEC) 10 MG tablet Take 10 mg by mouth daily.       desonide (DESOWEN) 0.05 % ointment Apply topically 2 (two) times a day.       FLUoxetine (PROZAC) 20 MG capsule Take 20 mg by mouth daily.       ketoconazole (NIZORAL) 2 % cream Apply topically daily.       LORazepam (ATIVAN) 0.5 MG tablet Take 1 mg by mouth 3 (three) times a day.       traZODone (DESYREL) 50 MG tablet Take 50 mg by mouth bedtime.       venlafaxine (EFFEXOR) 75 MG tablet Take 75 mg by mouth 2 (two) times a day.       benzonatate (TESSALON PERLES) 100 MG capsule Take 1 capsule (100 mg total) by mouth every 6 (six) hours as needed for cough. 30 capsule 0     buPROPion (WELLBUTRIN) 100 MG tablet Take 100 mg by mouth 2 (two) times a day.       No current facility-administered medications for this visit.      Current Outpatient Prescriptions on File Prior to Visit   Medication Sig     cetirizine (ZYRTEC) 10 MG tablet Take 10 mg by mouth daily.     desonide (DESOWEN) 0.05 % ointment Apply topically 2 (two) times a day.     FLUoxetine (PROZAC) 20 MG capsule Take 20 mg by mouth daily.     ketoconazole (NIZORAL) 2 % cream Apply topically daily.     traZODone (DESYREL) 50 MG tablet Take 50 mg by mouth bedtime.     benzonatate (TESSALON PERLES) 100 MG capsule Take 1  "capsule (100 mg total) by mouth every 6 (six) hours as needed for cough.     buPROPion (WELLBUTRIN) 100 MG tablet Take 100 mg by mouth 2 (two) times a day.     No current facility-administered medications on file prior to visit.      He has No Known Allergies..    Review of Systems  A 12 point comprehensive review of systems was negative except as noted.      Objective:      /72  Pulse 69  Temp 98.4  F (36.9  C) (Oral)   Ht 5' 10\" (1.778 m)  Wt (!) 222 lb 4.8 oz (100.8 kg)  SpO2 99%  BMI 31.9 kg/m2  General: alert, appears stated age and cooperative   Hydration:  well hydrated   Abdomen:    abnormal findings:  hyperactive bowel sounds and abdominal bloating      "

## 2021-06-11 NOTE — TELEPHONE ENCOUNTER
Catrachito is requesting to speak with MD Mittal regarding pain.  Catrachito had sutures due to a laceration on left hand yesterday.  Today pain is getting out of control and feels nauseated.  Tried tylenol and ibuprofen.  Catrachito is requesting pain medication for hand.  FNA advised if pain is severe to be seen within 4 hours and Catrachito is requesting to speak with MD Mittal first.      COVID 19 Nurse Triage Plan/Patient Instructions    Please be aware that novel coronavirus (COVID-19) may be circulating in the community. If you develop symptoms such as fever, cough, or SOB or if you have concerns about the presence of another infection including coronavirus (COVID-19), please contact your health care provider or visit [email or web address].     Disposition/Instructions    In-Person Visit with provider recommended. Reference Visit Selection Guide.    Thank you for taking steps to prevent the spread of this virus.  o Limit your contact with others.  o Wear a simple mask to cover your cough.  o Wash your hands well and often.    Resources    [facility]: About COVID-19: [email or web address]    CDC: What to Do If You're Sick: www.cdc.gov/coronavirus/2019-ncov/about/steps-when-sick.html    CDC: Ending Home Isolation: www.cdc.gov/coronavirus/2019-ncov/hcp/disposition-in-home-patients.html     CDC: Caring for Someone: www.cdc.gov/coronavirus/2019-ncov/if-you-are-sick/care-for-someone.html     [facility]: Interim Guidance for Hospital Discharge to Home: [email or web address]/[email or web address]/hcp/hospdischarge.pdf    [facility] clinical trials (COVID-19 research studies): [email or web address]     Below are the COVID-19 hotlines at the [place] Department of Health ([facility]). Interpreters are available.   o For health questions: Call 496-976-7072 or 1-368.231.3917 (7 a.m. to 7 p.m.)  o For questions about schools and childcare: Call 859-738-9300 or 1-109.174.1833 (7 a.m. to 7 p.m.)       Additional  Information    Negative: [1] Major bleeding (e.g., actively dripping or spurting) AND [2] can't be stopped    Negative: Amputation    Negative: Shock suspected (e.g., cold/pale/clammy skin, too weak to stand, low BP, rapid pulse)    Negative: [1] Knife wound (or other possibly deep cut) AND [2] to chest, abdomen, back, neck, or head    Negative: [1] Cutter (self-mutilator) AND [2] suicidal or out-of-control    Negative: Sounds like a life-threatening emergency to the triager    Negative: [1] Bleeding AND [2] won't stop after 10 minutes of direct pressure (using correct technique)    Negative: Skin is split open or gaping (or length > 1/2 inch or 12 mm on the skin, 1/4 inch or 6 mm on the face)    Negative: [1] Deep cut AND [2] can see bone or tendons    Negative: Sensation of something in the wound (i.e., retained object in wound)    Negative: [1] Dirt in the wound AND [2] not removed with 15 minutes of scrubbing    Negative: Wound causes numbness (i.e., loss of sensation)    Negative: Wound causes weakness (i.e., decreased ability to move hand, finger, toe)    [1] SEVERE pain AND [2] not improved 2 hours after pain medicine    Protocols used: CUTS AND [id number]

## 2021-06-12 NOTE — PROGRESS NOTES
ASSESSMENT/PLAN:       1. Fatty Liver  -Patient has a long-standing history of fatty liver.  He is requesting seen a specialist to see if there are other options to help control this.  I put in this referral and discussed it can sometimes be a bit of a wait.  In the meantime I congratulated him on the normalization of his LFTs.  We discussed further things such as increasing exercise, decreasing weight as able and limiting alcohol.  - Ambulatory referral to Gastroenterology    2. Gallbladder sludge  -The patient has a new diagnosis of some sludge in the gallbladder and he is wondering if he needs to have his gallbladder removed.  Interestingly he has had 2 or 3 episodes of what sounds like gastroenteritis in the last few weeks which certainly could be contributing to his symptoms.  We discussed that for now I had recommend watching for her right upper quadrant pain or further obvious food intolerances to fatty foods.  If that occurs he will let me know and I will happily refer him to general surgery for further evaluation and assessment.        Amina Mittal MD      PROGRESS NOTE   7/28/2017    SUBJECTIVE:  Jack Logan is a 36 y.o. male  who presents for follow up.     He has had a fatty liver for about 15 years.  Patient has had elevation of his LFTs in the past but they have normalized which is quite proud of.  He is wondering if he should see a specialist for this however.  Looking back his LFTs historically were over 100 and are now normal in the last 6 months.  He does still drink alcohol at times but otherwise does try to watch what he is eating.    He was in about one month ago for diarrhea.  His initial symptoms seem to happen from what he is attributing to maybe being spoiled milk.  He had vomiting and diarrhea which lasted for maybe about a week.  His vomiting resolved but he continued to have some diarrhea and then about a week afterwards started throwing up again.  Again the nausea  resolved he continued having diarrhea so he came in to be seen.  Things are finally starting to settle down, he did have some stool studies done which were normal as well as an ultrasound of his gallbladder which showed sludge.  He was able to go camping this last weekend without any difficulties and was by the end of the trip able to eat things such as hot dogs without any difficulty.      In going over his test results he sees that he has some sludge in his gallbladder and he is wondering if he needs to have this removed now.     He does have some swelling in his ankles as well over the weekend. He was eating more salty food, hamburgers, hot dogs, chips. He did have some alcohol this weekend as well. He is wondering if he needs to have a colonoscopy as well as he is now better.     [facility] his anxiety and mood disorder. He is [facility] connected through [facility] through that. He does have a pension through them as well.   Chief Complaint   Patient presents with     Follow-up     Patient is here today to follow up from his recent lab results and abd ultrasound. Patient states he is no longer having the diarrhea that he was seen for last week.          Patient Active Problem List   Diagnosis     Allergic Rhinitis     Anxiety     Hemorrhoids     Hyperlipidemia     Obesity     Intermittent Asthma     Fatty Liver     Bipolar Disorder     Candidal Intertrigo     Onychocryptosis     Ingrown toenail     Gallbladder sludge       Current Outpatient Prescriptions   Medication Sig Dispense Refill     benzonatate (TESSALON PERLES) 100 MG capsule Take 1 capsule (100 mg total) by mouth every 6 (six) hours as needed for cough. 30 capsule 0     buPROPion (WELLBUTRIN) 100 MG tablet Take 100 mg by mouth 2 (two) times a day.       cetirizine (ZYRTEC) 10 MG tablet Take 10 mg by mouth daily.       desonide (DESOWEN) 0.05 % ointment Apply topically 2 (two) times a day.       FLUoxetine (PROZAC) 20 MG  capsule Take 20 mg by mouth daily.       ketoconazole (NIZORAL) 2 % cream Apply topically daily.       LORazepam (ATIVAN) 0.5 MG tablet Take 1 mg by mouth 3 (three) times a day.       traZODone (DESYREL) 50 MG tablet Take 50 mg by mouth bedtime.       venlafaxine (EFFEXOR) 75 MG tablet Take 75 mg by mouth 2 (two) times a day.       No current facility-administered medications for this visit.        History   Smoking Status     Never Smoker   Smokeless Tobacco     Never Used           OBJECTIVE:        Recent Results (from the past 240 hour(s))   Hepatic Profile   Result Value Ref Range    Bilirubin, Total 0.3 0.0 - 1.0 mg/dL    Bilirubin, Direct 0.1 <=0.5 mg/dL    Protein, Total 6.1 6.0 - 8.0 g/dL    Albumin 3.3 (L) 3.5 - 5.0 g/dL    Alkaline Phosphatase 60 45 - 120 U/L    AST 29 0 - 40 U/L    ALT 36 0 - 45 U/L   HM2(CBC w/o Differential)   Result Value Ref Range    WBC 13.0 (H) 4.0 - 11.0 thou/uL    RBC 4.90 4.40 - 6.20 mill/uL    Hemoglobin 14.7 14.0 - 18.0 g/dL    Hematocrit 44.1 40.0 - 54.0 %    MCV 90 80 - 100 fL    MCH 30.0 27.0 - 34.0 pg    MCHC 33.3 32.0 - 36.0 g/dL    RDW 14.6 (H) 11.0 - 14.5 %    Platelets 134 (L) 140 - 440 thou/uL    MPV 14.4 (H) 8.5 - 12.5 fL   Culture, Stool   Result Value Ref Range    Culture       No Salmonella, Shigella, Yersinia, or Campylobacter.   Ova and Parasite, Stool   Result Value Ref Range    Ova + Parasite Exam No ova or parasites seen No Ova or Parasites seen   EnteroHaemorrhagic E. coli Work Up   Result Value Ref Range    Shiga Toxin 1 Negative Negative    Shiga Toxin 2 Negative Negative       Vitals:    07/28/17 0924   BP: 102/70   Patient Site: Left Arm   Patient Position: Sitting   Cuff Size: Adult Regular   Pulse: 60   SpO2: 98%   Weight: 220 lb 11.2 oz (100.1 kg)     Weight: 220 lb 11.2 oz (100.1 kg)        Physical Exam:  GENERAL APPEARANCE: A&A, NAD, well hydrated, well nourished  SKIN:  Normal skin turgor, no lesions/rashes   HEENT: moist mucous membranes, no  rhinorrhea  NECK: Normal, without lymphadenopathy  CV: RRR, no M/G/R   LUNGS: CTAB  ABDOMEN: S&NT, no masses, negative McBurney's  EXTREMITY: no edema   NEURO: no gross deficits

## 2021-06-15 NOTE — TELEPHONE ENCOUNTER
Reason for Call:  Other call back      Detailed comments: PLEASE CALL PT BACK REGARDING THE MYCHART MESSAGING THT HAS GONE BACK AND FORTH BETWEEN PT AND DR BENITEZ    Phone Number Patient can be reached at:   [phone]      eave a detailed message on this number?: No    Call taken on 2/5/2021 at 11:50 AM by Evelin Martinez          [phone]

## 2021-06-16 PROBLEM — G47.30 SLEEP APNEA: Status: ACTIVE | Noted: 2018-05-14

## 2021-06-16 PROBLEM — K82.8 GALLBLADDER SLUDGE: Status: ACTIVE | Noted: 2017-07-28

## 2021-06-16 PROBLEM — K76.0 FATTY LIVER: Status: ACTIVE | Noted: 2017-10-30

## 2021-06-16 NOTE — PROGRESS NOTES
ASSESSMENT/PLAN:       1. Groin pain, left  -The patient and I talked about a broad differential diagnosis including testicular issues, prostatitis, and a pulled muscle.  Given his normal exam I do suspect that this is secondary to a pulled muscle given the movements that he has when he has the pain.  For now I recommended rest for the next week or so, he can try heat and ice as well as anti-inflammatories as needed and follow-up in the next few weeks if it is not improving and we can consider referral to urology for further evaluation and assessment.    25 minutes was spent face-to-face with the patient during this encounter and >50% of this was spent on counseling and coordination of care. We discussed in depth the topics listed above.       Amina Mittal MD      PROGRESS NOTE   3/7/2018    SUBJECTIVE:  Jack Logan is a 37 y.o. male  who presents for left groin pain.     He has been doing PT for low back issues and sciatica issues. Last spring he was doing personal training at the gym and was having some low back issues. He then stopped going to the gym and has been going to the chiropractor and started PT. He does feel that things are improving. He does have more range of motion and is losing weight. He has been going to the chiropractor for a few months, but has been going to PT for the last few weeks [facility]. Last Thursday he went there and did his usual routine and got home and had an attack of pain in the left groin. The normal sciatic pain for him is on the outside of the hip. He did try and rest things over the weekend. He did have the same pain two more times over the weekend. When he went in yesterday and talked to her about it and they recommended that he be seen but thought that it was a strained muscle.     All three times that he has had the pain he was engaged in masturbation. He may have some bulging in the groin. He has had no issues with urination or having bowel movements. He  did have one episode where he did not have pain, in retrospect he was standing at that time rather than laying down. He has had no fevers, no obvious blood in the semen or urine.  He feels that the pain happens with ejaculation and perhaps with the thrusting motion that comes along with that.  Chief Complaint   Patient presents with     Groin Pain     Patient is being seen at [facility] for lower back pain. After doing the exercises, he has started to have left side groin pain. He started to notice this pain last week Thursday. [facility] believes it might be just a pulled muscle but recommended to get checked over.          Patient Active Problem List   Diagnosis     Allergic Rhinitis     Anxiety     Hemorrhoids     Hyperlipidemia     Obesity     Intermittent Asthma     Fatty Liver     Bipolar Disorder     Candidal Intertrigo     Onychocryptosis     Ingrown toenail     Gallbladder sludge     Cirrhosis of liver       Current Outpatient Prescriptions   Medication Sig Dispense Refill     cetirizine (ZYRTEC) 10 MG tablet Take 10 mg by mouth daily.       dextroamphetamine-amphetamine (ADDERALL) 10 mg Tab tablet Take 10 mg by mouth 3 (three) times a day.       FLUoxetine (PROZAC) 20 MG capsule Take 20 mg by mouth daily.       traZODone (DESYREL) 50 MG tablet Take 50 mg by mouth bedtime.       benzonatate (TESSALON PERLES) 100 MG capsule Take 1 capsule (100 mg total) by mouth every 6 (six) hours as needed for cough. 30 capsule 0     buPROPion (WELLBUTRIN) 100 MG tablet Take 100 mg by mouth 2 (two) times a day.       desonide (DESOWEN) 0.05 % ointment Apply topically 2 (two) times a day.       ketoconazole (NIZORAL) 2 % cream Apply topically daily.       LORazepam (ATIVAN) 0.5 MG tablet Take 1 mg by mouth 3 (three) times a day.       venlafaxine (EFFEXOR) 75 MG tablet Take 75 mg by mouth 2 (two) times a day.       No current facility-administered medications for this visit.        History   Smoking Status     Never Smoker    Smokeless Tobacco     Never Used           OBJECTIVE:        No results found for this or any previous visit (from the past 240 hour(s)).    Vitals:    03/07/18 1307 03/07/18 1348   BP: (!) 142/94 130/90   Patient Site: Left Arm    Patient Position: Sitting    Cuff Size: Adult Large    Pulse: 92    SpO2: 99%    Weight: (!) 227 lb 8 oz (103.2 kg)      Weight: 227 lb 8 oz (103.2 kg)        Physical Exam:  GENERAL APPEARANCE: A&A, NAD, well hydrated, well nourished  SKIN:  Normal skin turgor, no lesions/rashes   HEENT: moist mucous membranes, no rhinorrhea  NECK: Normal, without lymphadenopathy  CV: RRR, no M/G/R   LUNGS: CTAB  ABDOMEN: S&NT, no masses   Genital exam reveals normal external genitalia, right and left testicles appear unremarkable and have no obvious masses, prostate exam feels normal in size and does not have any tenderness to palpation  EXTREMITY: no edema   NEURO: no gross deficits

## 2021-06-17 NOTE — PROGRESS NOTES
" OV   4/26/2018  Assessment:     1. Cough     2. Pleuritic chest pain     3. Hypoxia     4. Tachycardia        Plan:      We discussed potential etiologies for his symptoms and I am concerned for pneumonia and with his hypoxia, tachycardia and pleuritic chest pain. I strongly recommended that they go to [facility] ER for evaluation and possible admission. They area agreeable and will head right over that way.      Subjective:    Patient is present with his wife today.          Jack Logan presents for evaluation of chest congestion, cough, chills and fever. Symptoms began 6 days ago and have gradually worsened since that time. Today he is having significant pain in his anterior chest worse with deep respirations, and significant pain with coughing. Cough is described as productive of yellow sputum, with wheezing, chest is painful during coughing. Symptoms are associated with decrease in appetite, decrease in energy level, headache, myalgia and sore throat, wheezing and nausea and vomiting. He denies hemoptysis. Past history is significant for asthma and occasional episodes of bronchitis. He has been on amoxicillin for the last 2-3 days without any change.     The following portions of the patient's history were reviewed and updated as appropriate: allergies, current medications, past family history, past medical history, past social history, past surgical history and problem list.    Review of Systems  Pertinent items are noted in HPI.        Objective:        Vitals:    04/26/18 1524   Pulse: (!) 128   SpO2: 94%   Weight: (!) 224 lb 7 oz (101.8 kg)   Height: 5' 10\" (1.778 m)      General     Alert, mod distress, mildly diaphoretic. Reluctant to move or sit up due to nausea.    Head:    Normocephalic, without obvious abnormality, atraumatic   Eyes:    PERRL, conjunctiva/corneas clear, EOM's intact   Ears:    Normal TM's and external ear canals, both ears   Nose:   Nasal mucosa normal, no drainage, and no sinus " tenderness   Throat:   Oropharynx is clear with moist mucous membranes     Neck:   Supple, mild anterior cervical adenopathy and supple, symmetrical, trachea midline    Lungs:     clear to auscultation bilaterally but respirations are shallow   Heart :    Regular rate and rhythm, no murmur, rub or gallop   Abdomen:     Soft, non-tender, no masses   Skin:   Skin color, texture, turgor normal, no rashes or lesions

## 2021-06-17 NOTE — PROGRESS NOTES
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  Interestingly, he relates the fact that he was recently working on his bathroom 6 days ago where he used bleach to clean off the wallpaper and clean the walls.  He thinks this may have irritated his lungs.    ROS: No fever vomiting or diarrhea.  Positive for chills.    SH: The Patient's  reports that he has never smoked. He has never used smokeless tobacco. He reports that he drinks about 1.5 oz of alcohol per week  He reports that he does not use illicit drugs.      FH: The Patient's family history includes Autism spectrum disorder in his mother; Cancer in his other and paternal uncle; Cancer (age of onset: 52) in his father; Colon cancer in his cousin; Cystic fibrosis in his daughter; Glaucoma in his paternal grandmother; Leukemia in his cousin; Melanoma in his paternal grandmother; Pancreatic cancer in his paternal aunt, paternal grandfather, and paternal grandmother; Parkinsonism in his maternal grandmother; Stomach cancer in his other.     Meds:  Jack has a current medication list which includes the following prescription(s): cetirizine, desonide, dextroamphetamine-amphetamine, ketoconazole, lorazepam, trazodone, venlafaxine, amoxicillin, benzonatate, bupropion, and fluoxetine.    O:  BP 90/62  Pulse (!) 138  Temp 98.2  F (36.8  C)  Resp 24  Ht 5' 10\" (1.778 m)  Wt (!) 225 lb 9 oz (102.3 kg)  SpO2 96%  BMI 32.36 kg/m2   Constitutional:    --Vitals as above  --No acute distress but mild cough noted  Eyes-  --Sclera noninjected  --Lids and conjunctiva normal  ENT-  --TMs " clear  --Sclera noninjected  --Pharynx mildly erythematous  Neck-  --Neck supple    Lymph-  --No cervical lymphadenopathy  Lungs-  --Clear to Auscultation  Heart-  --Regular rate and rhythm  Skin-  --Pink and dry          A/P:   1. Bronchitis  -He has been having symptoms for 6 days and not improving.  Given his symptoms, positive past history of pneumonia, and the fact that he had chemical exposure it would be reasonable to treat him at this time.  - amoxicillin (AMOXIL) 875 MG tablet; Take 1 tablet (875 mg total) by mouth 2 (two) times a day for 10 days.  Dispense: 20 tablet; Refill: 0  -Increase fluids and rest  -Ibuprofen for pain

## 2021-06-18 NOTE — PROGRESS NOTES
ASSESSMENT/PLAN:       1. Acute bronchitis  -The patient's exam is unremarkable today thankfully.  We discussed that it typically can take a few weeks following influenza and pneumonia to have resolution of his symptoms.  I provided him with some Tessalon Perles to use as needed for the cough, I discussed that this certainly could be secondary to postnasal drip with his allergies and recommended that he start using his Flonase on a regular basis and follow-up here by my chart in the next week or 2 if things are not resolving and we can try another antibiotic.  - benzonatate (TESSALON PERLES) 100 MG capsule; Take 1-2 capsules (100-200 mg total) by mouth every 6 (six) hours as needed for cough.  Dispense: 30 capsule; Refill: 0        Amina Mittal MD      PROGRESS NOTE   5/14/2018    SUBJECTIVE:  Jack Logan is a 37 y.o. male  who presents for not feeling well.     He was diagnosed with bronchitis and prescribed antibiotics, two following days he was getting worse, was back here and then sent to the ER, he was diagnosed with influenza A. This was 2.5 weeks ago. He was diagnosed with the flu and pneumonia. The following day he went to the [facility] and felt pretty good. He got home and by then he was feeling worse. The rest of the week he continued to improve day to day. The last week he was feeling quite well but is still coughing at times. It is heavy, wet and not going away. He sent in a message and was due for a follow up as well.     He is flying to [place] Wednesday morning and doesn't want to wear a mask. He was provided an inhaler and azithromycin. He has been using the inhaler a few times a day every day.     The groin pain is gone with stretching. The sciatic pain is better still. He does still experience the pain with orgasm. He is wondering if this is related to positional issues when he has the orgasm. He doesn't have the issues with sex though, just with masturbation.     He hasn't had  any issues with the right upper belly. He is losing weight and is working on this.     He does have allergies and does use the allergy medicine two times a day right now. He does use as needed nasal spray when he is mowing the lawn. He has not had any fevers. He is somewhat tired.       Chief Complaint   Patient presents with     Cough     Patient has been having a productive cough with shortness of breath and wheezing. No known fevers. Patient recently had pneumonia and influenza a.          Patient Active Problem List   Diagnosis     Allergic Rhinitis     Anxiety     Hemorrhoids     Hyperlipidemia     Obesity     Intermittent Asthma     Fatty Liver     Bipolar Disorder     Candidal Intertrigo     Onychocryptosis     Ingrown toenail     Gallbladder sludge     Cirrhosis of liver     Sleep apnea       Current Outpatient Prescriptions   Medication Sig Dispense Refill     albuterol (PROAIR HFA;PROVENTIL HFA;VENTOLIN HFA) 90 mcg/actuation inhaler Inhale 2 puffs every 4 (four) hours as needed for wheezing. 1 Inhaler 0     buPROPion (WELLBUTRIN) 100 MG tablet Take 100 mg by mouth 2 (two) times a day.       cetirizine (ZYRTEC) 10 MG tablet Take 10 mg by mouth daily.       desonide (DESOWEN) 0.05 % ointment Apply topically 2 (two) times a day.       dextroamphetamine-amphetamine (ADDERALL) 10 mg Tab tablet Take 10 mg by mouth 3 (three) times a day.       FLUoxetine (PROZAC) 20 MG capsule Take 20 mg by mouth daily.       fluticasone (FLONASE) 50 mcg/actuation nasal spray 1 spray into each nostril daily.       ketoconazole (NIZORAL) 2 % cream Apply topically daily.       LORazepam (ATIVAN) 0.5 MG tablet Take 1 mg by mouth 3 (three) times a day.       traZODone (DESYREL) 50 MG tablet Take 50 mg by mouth bedtime.       venlafaxine (EFFEXOR) 75 MG tablet Take 75 mg by mouth 2 (two) times a day.       benzonatate (TESSALON PERLES) 100 MG capsule Take 1-2 capsules (100-200 mg total) by mouth every 6 (six) hours as needed for  cough. 30 capsule 0     No current facility-administered medications for this visit.        History   Smoking Status     Never Smoker   Smokeless Tobacco     Never Used           OBJECTIVE:        No results found for this or any previous visit (from the past 240 hour(s)).    Vitals:    05/14/18 0849   BP: 120/70   Pulse: 84   Temp: 97.2  F (36.2  C)   TempSrc: Oral   SpO2: 95%   Weight: 220 lb (99.8 kg)     Weight: 220 lb (99.8 kg)        Physical Exam:  GENERAL APPEARANCE: A&A, NAD, well hydrated, well nourished  SKIN:  Normal skin turgor, no lesions/rashes   HEENT: moist mucous membranes, no rhinorrhea, pharynx shows some postnasal drip  NECK: Normal, without lymphadenopathy  CV: RRR, no M/G/R   LUNGS: CTAB  NEURO: no gross deficits

## 2021-06-19 NOTE — PROGRESS NOTES
Assessment:     1. Routine general medical examination at a health care facility    2. Hyperlipidemia    3. Fatty liver    4. Thrombocytopenia (H)    5. Lipid screening    6. Need for vaccination        Plan:      1. Routine general medical examination at a health care facility  -Routine health maintenance discussion:  No smoking, limited alcohol (7 or less servings per week), 5 fruits/veg servings per day, 200 minutes of exercise per week.  Daily calcium/vitamin D guidelines, bone health, colon cancer screening beginning at age 50.  Accident avoidance, sun screen.     2. Hyperlipidemia  -History of high cholesterol in the past. Will update labs today.   - Comprehensive Metabolic Panel    3. Fatty liver  -Will screen for diabetes given the fatty liver.   - Glycosylated Hemoglobin A1c    4. Thrombocytopenia (H)  -Will update today. He has not been seen by hematology in the past.   - HM2(CBC w/o Differential)    5. Lipid screening  - Lipid [place]    6. Need for vaccination  -Will update tetanus today as he is due. Additionally, it was recommended that he have the hepatitis B vaccine by GI given the fatty liver.   - Td, Preservative Free (green label)  - Hepatitis B Vaccine Age 20 years and above; Standing  - Hepatitis B Vaccine Age 20 years and above       Subjective:      Jack Logan is a 37 y.o. male who presents for an annual exam. The patient reports that there is not domestic violence in his life.     He is here today for a physical. He does not have any concerns. He did have a history of fatty liver. He was told that he was maybe having cirrhosis, but he did see GI and was told that it was fatty liver. There are no other concerns.     Healthy Habits:   Regular Exercise: Yes, counting/watching steps daily.   Sunscreen Use: Yes  Healthy Diet: No  Dental Visits Regularly: No  Seat Belt: Yes  Sexually active: Yes  Monthly Self Testicular Exams:  Yes  Hemoccults: N/A  Flex Sig: N/A  Colonoscopy: N/A  Lipid  Profile: No  Glucose Screen: No  Prevention of Osteoporosis: No  Last Dexa: N/A  Guns at Home:  No      Immunization History   Administered Date(s) Administered     DT (pediatric) 01/01/1998     Hep A, historic 08/06/2007, 06/16/2010     Influenza, Seasonal, Inj PF IIV3 10/06/2010, 09/23/2013     Influenza, inj, historic,unspecified 01/09/2008, 10/18/2008     Influenza, seasonal,quad inj 36+ mos 10/13/2015, 10/25/2016     Td,adult,historic,unspecified 01/01/1998     Tdap 08/06/2007     Immunization status: up to date and documented, missing doses of Td.    No exam data present     Current Outpatient Prescriptions   Medication Sig Dispense Refill     albuterol (PROAIR HFA;PROVENTIL HFA;VENTOLIN HFA) 90 mcg/actuation inhaler Inhale 2 puffs every 4 (four) hours as needed for wheezing. 1 Inhaler 0     buPROPion (WELLBUTRIN) 100 MG tablet Take 100 mg by mouth 2 (two) times a day.       cetirizine (ZYRTEC) 10 MG tablet Take 10 mg by mouth daily.       desonide (DESOWEN) 0.05 % ointment Apply topically 2 (two) times a day.       dextroamphetamine-amphetamine (ADDERALL) 10 mg Tab tablet Take 10 mg by mouth 3 (three) times a day.       FLUoxetine (PROZAC) 20 MG capsule Take 20 mg by mouth daily.       fluticasone (FLONASE) 50 mcg/actuation nasal spray 1 spray into each nostril daily.       ketoconazole (NIZORAL) 2 % cream Apply topically daily.       LORazepam (ATIVAN) 0.5 MG tablet Take 1 mg by mouth 3 (three) times a day.       traZODone (DESYREL) 50 MG tablet Take 50 mg by mouth bedtime.       venlafaxine (EFFEXOR) 75 MG tablet Take 75 mg by mouth 2 (two) times a day.       benzonatate (TESSALON PERLES) 100 MG capsule Take 1-2 capsules (100-200 mg total) by mouth every 6 (six) hours as needed for cough. 30 capsule 0     No current facility-administered medications for this visit.      Past Medical History:   Diagnosis Date     ADHD (attention deficit hyperactivity disorder)      Allergic rhinitis, cause unspecified       "Anxiety      Asthma      Cystic fibrosis carrier      Depression      Hearing loss      Obesity      Onychomycosis      Past Surgical History:   Procedure Laterality Date     APPENDECTOMY       atypical nevus       TN APPENDECTOMY      Description: Appendectomy;  Recorded: 11/20/2008;     WISDOM TOOTH EXTRACTION       Review of patient's allergies indicates no known allergies.  Family History   Problem Relation Age of Onset     Cancer Father 52     nasopharyngeal     Pancreatic cancer Paternal Aunt      diagnosed in her 50s-60s     Parkinsonism Maternal Grandmother      Glaucoma Paternal Grandmother      Pancreatic cancer Paternal Grandmother      diagnosed in her 60s-70s     Melanoma Paternal Grandmother      diagnosed in her 60s-70s     Autism spectrum disorder Mother      Cancer Paternal Uncle      nasopharyngeal cancer, diagnosed in his 50s-60s     Pancreatic cancer Paternal Grandfather      possible diagnosis     Cystic fibrosis Daughter      Colon cancer Cousin      paternal first-cousin, prior to age 50     Leukemia Cousin      paternal first-cousin, in childhood     Cancer Other      great-uncle (paternal grandmother's brother), unknown type     Stomach cancer Other      great-aunt (paternal grandmother's sister)     Social History     Social History     Marital status:      Spouse name: N/A     Number of children: N/A     Years of education: N/A     Occupational History     Not on file.     Social History Main Topics     Smoking status: Never Smoker     Smokeless tobacco: Never Used     Alcohol use 1.5 oz/week     3 Standard drinks or equivalent per week     Drug use: No     Sexual activity: Yes     Partners: Female     Other Topics Concern     Not on file     Social History Narrative       Review of Systems  Review of Systems   Grossly positive, see scanned form for full details.           Objective:     Vitals:    07/09/18 1210   Weight: (!) 226 lb 9.6 oz (102.8 kg)   Height: 5' 10\" (1.778 m) "     Body mass index is 32.51 kg/(m^2).    Physical  Physical Exam  Well developed, well nourished, no acute distress.  HEENT: normocephalic/atraumatic, PERRLA/EOMI, TMs: Gray, normal light reflex, no nasal discharge.  Oral mucosa: no erythema/exudate  Neck: No LAD/masses/thyromegaly  Lungs: clear bilaterally  Heart: regular rate and rhythm, no murmurs/gallops/rubs  Abdomen: Normal bowel sounds, soft, non-tender, non-distended, no masses, neg Perry's/McBurney's, no rebound/guarding  Genital: deferred, updated a few months ago  Lymphatics: no supraclavicular/axillary/epitrochlear/inguinal LAD. No edema.  Neuro: A&O x 3, CN II-XII intact, strength 5/5, reflexes symmetric, sensory intact to light touch.  Psych: Behavior appropriate, engaging.  Thought processes congruent, non-tangential.  Musculoskeletal: no gross deformities.  Skin: no rashes or lesions.

## 2021-06-19 NOTE — LETTER
Letter by Amina Mittal MD at      Author: Amina Mittal MD Service: -- Author Type: --    Filed:  Encounter Date: 7/29/2019 Status: (Other)         Asthma Action Plan    Patient Name: Jack Logan  Patient YOB: 1980    Doctor's Name: Amina Mittal    Emergency Contact: Rosenda Logan [phone]           Severity Classification: Intermittent    What triggers my asthma: allergies    GREEN ZONE: Doing Well   No cough, wheeze, chest tightness or shortness of breath during the day or night  Can do your usual activities    Take these medicines before exercise if your asthma is exercise-induced:  Medicine How Much to Take When to take it   albuterol  (also known as ProAir, Ventolin and Proventil) 2 puffs 15-30 minutes prior to exercise or sports     YELLOW ZONE: Asthma is Getting Worse   Cough, wheeze, chest tightness or shortness of breath or  Waking at night due to asthma, or  Can do some, but not all, usual activities.    Keep taking green zone medications and add quick-relief medicine:  Quick Relief Medicine How Much to Take When to take it   albuterol  (also known as ProAir, Ventolin and Proventil) 2 puffs every 4 hours as needed     If you do not feel better and your symptoms do not return to the green zone after one hour of the quick relief medication, then:    Take quick relief treatment again. Call your clinician within 1 hour.    Contact your clinician if you are using quick relief medication more than 2 times per week.    RED ZONE: Medical Alert!   Very short of breath, or  Quick relief medications have not helped, or  Cannot do usual activities, or  Symptoms are same or worse after 24 hours in the Yellow Zone.    Continue green zone medicines and add:  Quick Relief Medicine Dose When to take it   albuterol  (also known as ProAir, Ventolin and Proventil) 2 puffs may repeat every 20 minutes for up to 1 hour     IF ANY OF THESE ARE HAPPENING, SEEK  EMERGENCY HELP AND CALL 911!   You are struggling to breathe and are uncomfortable or  There is simply no clear improvement and you are worried about how to get through the next 30 minutes or  Trouble walking and talking due to shortness of breath, or  Lips or fingernails are blue    Provider signature:  Electronically Signed by Amina Mittal   Date: 08/01/19

## 2021-06-20 NOTE — LETTER
Letter by Marcie Wilkerson at      Author: Marcie Wilkerson Service: -- Author Type: --    Filed:  Encounter Date: 12/26/2019 Status: Signed          December 26, 2019      Jack Logan  [address] [facility] [address]      Dear Jack Logan,    We have processed your request for proxy access to [facility]. If you did not make a request to lucia proxy access to an individual, please contact us immediately at [phone].    Through proxy access, your family member or other individual you approve, will be provided secure online access to information regarding your health. Through [facility], they will be able to review instructions from your health care provider, send a secure message to your provider, view test results, manage your appointments and more.    Again, thank you for registering for [employer]. Our team looks forward to partnering with you in managing your medical care and supporting healthy behaviors.     Thank you for choosing [facility].    Sincerely,    [employer] [facility]    If you have any further questions, please contact our [employer] Support Team by phone [phone] or email, [email or web address].

## 2021-06-26 ENCOUNTER — HEALTH MAINTENANCE LETTER (OUTPATIENT)
Age: 41
End: 2021-06-26

## 2021-07-03 NOTE — ADDENDUM NOTE
Addendum Note by Caleb Suazo CMA at 7/17/2017  2:34 PM     Author: Caleb Suazo CMA Service: -- Author Type: Certified Medical Assistant    Filed: 7/17/2017  2:34 PM Encounter Date: 7/17/2017 Status: Signed    : Caleb Suazo CMA (Certified Medical Assistant)    Addended by: CALEB SUAZO on: 7/17/2017 02:34 PM        Modules accepted: Orders

## 2021-08-06 ENCOUNTER — MYC MEDICAL ADVICE (OUTPATIENT)
Dept: FAMILY MEDICINE | Facility: CLINIC | Age: 41
End: 2021-08-06

## 2021-08-17 ENCOUNTER — MYC MEDICAL ADVICE (OUTPATIENT)
Dept: FAMILY MEDICINE | Facility: CLINIC | Age: 41
End: 2021-08-17

## 2021-09-14 ENCOUNTER — OFFICE VISIT (OUTPATIENT)
Dept: FAMILY MEDICINE | Facility: CLINIC | Age: 41
End: 2021-09-14
Payer: COMMERCIAL

## 2021-09-14 VITALS
WEIGHT: 233 LBS | RESPIRATION RATE: 21 BRPM | SYSTOLIC BLOOD PRESSURE: 124 MMHG | HEART RATE: 78 BPM | TEMPERATURE: 97.8 F | BODY MASS INDEX: 33.62 KG/M2 | DIASTOLIC BLOOD PRESSURE: 70 MMHG | OXYGEN SATURATION: 97 %

## 2021-09-14 DIAGNOSIS — S61.213A LACERATION OF LEFT MIDDLE FINGER WITHOUT FOREIGN BODY WITHOUT DAMAGE TO NAIL, INITIAL ENCOUNTER: Primary | ICD-10-CM

## 2021-09-14 PROCEDURE — 99214 OFFICE O/P EST MOD 30 MIN: CPT | Performed by: PHYSICIAN ASSISTANT

## 2021-09-14 NOTE — PROGRESS NOTES
Assessment & Plan:      Problem List Items Addressed This Visit     None      Visit Diagnoses     Laceration of left middle finger without foreign body without damage to nail, initial encounter    -  Primary    Relevant Medications    lidocaine 1 % 3 mL (Completed)        Medical Decision Making  Patient presents with 4 curvilinear lacerations affecting the lateral aspect of the distal, left third finger.  Patient soak wound in warm water and Hibiclens.  Also administered 2 to 3 mL of 1% lidocaine without epinephrine for a digital block in order to evaluate lacerations further.  Further evaluation of the wound showed no involvement of tendon laceration or foreign body.  Patient remained full range of motion of all finger joints without difficulty.  Skin flaps were well approximated with patient's finger in full extension, thus decided that laceration repair was not necessary.  Apply topical antibiotics with a nonadherent dressing.  Held in place with Coban wrap.  Applied patient with a finger splint.  Discussed avoidance of submerging wound under water.  Discussed proper wound care.  Discussed signs of worsening symptoms and when to follow-up with PCP if no symptom improvement.     Subjective:      Jack Logan is a 40 year old male here for evaluation of laceration to the left hand third finger.  Event of injury occurred 3 to 4 hours ago.  Patient was using a  with automatic chainsaw.  Patient was reaching up with his left hand when the chainsaw swung around and caught patient's middle finger.  He developed several cuts with significant bleeding at that time.  Patient was able to wrap the finger with very tight gauze to stop the bleeding.  He notes significant pain in the finger.  Patient's tetanus is up-to-date.     The following portions of the patient's history were reviewed and updated as appropriate: allergies, current medications, and problem list.     Review of Systems  Pertinent items are  noted in HPI.    Allergies  Allergies   Allergen Reactions     Cats Hives and Shortness Of Breath     Pollen Extract Rash       Family History   Problem Relation Age of Onset     Cancer Father 52.00        nasopharyngeal     Pancreatic Cancer Paternal Aunt         diagnosed in her 50s-60s     Parkinsonism Maternal Grandmother      Glaucoma Paternal Grandmother      Pancreatic Cancer Paternal Grandmother         diagnosed in her 60s-70s     Melanoma Paternal Grandmother         diagnosed in her 60s-70s     Autism Spectrum Disorder Mother      Cancer Paternal Uncle         nasopharyngeal cancer, diagnosed in his 50s-60s     Pancreatic Cancer Paternal Grandfather         possible diagnosis     Cystic Fibrosis Daughter      Colon Cancer Cousin         paternal first-cousin, prior to age 50     Leukemia Cousin         paternal first-cousin, in childhood     Cancer Other         great-uncle (paternal grandmother's brother), unknown type     Stomach Cancer Other         great-aunt (paternal grandmother's sister)       Social History     Tobacco Use     Smoking status: Never Smoker     Smokeless tobacco: Never Used   Substance Use Topics     Alcohol use: Yes     Alcohol/week: 2.5 standard drinks     Comment: Alcoholic Drinks/day: Social         Objective:      /70   Pulse 78   Temp 97.8  F (36.6  C)   Resp 21   Wt 105.7 kg (233 lb)   SpO2 97%   BMI 33.62 kg/m    General appearance - alert, well appearing, and in no distress and non-toxic  Skin - Left hand third finger: 4 curvilinear lacerations  about 1 cm in length each with skin flaps affecting the lateral aspect of the distal third finger

## 2021-09-14 NOTE — PATIENT INSTRUCTIONS
Patient Education     Abrasions  Abrasions are skin scrapes. Their treatment depends on how large and deep the abrasion is.   Home care  You may be prescribed an antibiotic cream or ointment to apply to the wound. This helps prevent infection. Follow instructions when using this medicine.   General care    To care for the abrasion, do the following each day for as long as directed by your healthcare provider:  ? If you were given a bandage, change it once a day. If your bandage sticks to the wound, soak it in warm water until it loosens.  ? Wash the area with soap and warm water. You may do this in a sink or under a tub faucet or shower. Rinse off the soap. Then pat the area dry with a clean towel.  ? If antibiotic ointment or cream was prescribed, reapply it to the wound as directed. Cover the wound with a fresh nonstick bandage. If the bandage becomes wet or dirty, change it as soon as possible.  ? Some antibiotic ointments or cream can cause an allergic reaction or dermatitis. This may cause redness, itching and or hives. If this occurs, stop using the ointment right away and wash off any remaining ointment. You may need to take some allergy medicine to relieve symptoms.    You may use acetaminophen or ibuprofen to control pain unless another pain medicine was prescribed. Talk with your healthcare provider before using these medicines if you have chronic liver or kidney disease or ever had a stomach ulcer or GI (gastrointestinal) bleeding. Don t use ibuprofen in children younger than 6 months old.    Most skin wounds heal within 10 days. But an infection may occur even with treatment. So it s important to watch the wound for signs of infection as listed below.    Follow-up care  Follow up with your healthcare provider, or as advised.  When to get medical advice  Call your healthcare provider right away if any of these occur:    Fever of 100.4 F (38 C) or higher, or as directed by your healthcare  provider    Increasing pain, redness, swelling, or drainage from the wound    Bleeding from the wound that does not stop after a few minutes of steady, firm pressure    Decreased ability to move any body part near the wound  Michael last reviewed this educational content on 8/1/2019 2000-2021 [employer]. All rights reserved. This information is not intended as a substitute for professional medical care. Always follow your healthcare professional's instructions.

## 2021-09-29 ENCOUNTER — DOCUMENTATION ONLY (OUTPATIENT)
Dept: ONCOLOGY | Facility: CLINIC | Age: 41
End: 2021-09-29

## 2021-09-29 ENCOUNTER — OFFICE VISIT (OUTPATIENT)
Dept: FAMILY MEDICINE | Facility: CLINIC | Age: 41
End: 2021-09-29
Payer: COMMERCIAL

## 2021-09-29 VITALS
HEIGHT: 70 IN | HEART RATE: 72 BPM | DIASTOLIC BLOOD PRESSURE: 68 MMHG | SYSTOLIC BLOOD PRESSURE: 122 MMHG | BODY MASS INDEX: 32.83 KG/M2 | WEIGHT: 229.3 LBS | RESPIRATION RATE: 17 BRPM

## 2021-09-29 DIAGNOSIS — E78.5 HYPERLIPIDEMIA, UNSPECIFIED HYPERLIPIDEMIA TYPE: ICD-10-CM

## 2021-09-29 DIAGNOSIS — R53.83 OTHER FATIGUE: ICD-10-CM

## 2021-09-29 DIAGNOSIS — Z80.0 FAMILY HISTORY OF PANCREATIC CANCER: ICD-10-CM

## 2021-09-29 DIAGNOSIS — Z13.220 LIPID SCREENING: ICD-10-CM

## 2021-09-29 DIAGNOSIS — K76.0 FATTY LIVER: ICD-10-CM

## 2021-09-29 DIAGNOSIS — L60.0 ONYCHOCRYPTOSIS: ICD-10-CM

## 2021-09-29 DIAGNOSIS — R39.11 URINARY HESITANCY: ICD-10-CM

## 2021-09-29 DIAGNOSIS — Z00.00 ENCOUNTER FOR ROUTINE HISTORY AND PHYSICAL EXAMINATION OF ADULT: Primary | ICD-10-CM

## 2021-09-29 DIAGNOSIS — F84.0 AUTISM: ICD-10-CM

## 2021-09-29 DIAGNOSIS — Z83.3 FAMILY HISTORY OF DIABETES MELLITUS: ICD-10-CM

## 2021-09-29 LAB
ALBUMIN SERPL-MCNC: 4.5 G/DL (ref 3.5–5)
ALP SERPL-CCNC: 73 U/L (ref 45–120)
ALT SERPL W P-5'-P-CCNC: 46 U/L (ref 0–45)
ANION GAP SERPL CALCULATED.3IONS-SCNC: 10 MMOL/L (ref 5–18)
AST SERPL W P-5'-P-CCNC: 26 U/L (ref 0–40)
BILIRUB SERPL-MCNC: 0.3 MG/DL (ref 0–1)
BUN SERPL-MCNC: 14 MG/DL (ref 8–22)
CALCIUM SERPL-MCNC: 10.3 MG/DL (ref 8.5–10.5)
CHLORIDE BLD-SCNC: 108 MMOL/L (ref 98–107)
CHOLEST SERPL-MCNC: 179 MG/DL
CO2 SERPL-SCNC: 25 MMOL/L (ref 22–31)
CREAT SERPL-MCNC: 1.03 MG/DL (ref 0.7–1.3)
ERYTHROCYTE [DISTWIDTH] IN BLOOD BY AUTOMATED COUNT: 13.7 % (ref 10–15)
FASTING STATUS PATIENT QL REPORTED: YES
GFR SERPL CREATININE-BSD FRML MDRD: 89 ML/MIN/1.73M2
GLUCOSE BLD-MCNC: 93 MG/DL (ref 70–125)
HBA1C MFR BLD: 5.5 % (ref 0–5.6)
HCT VFR BLD AUTO: 44.7 % (ref 40–53)
HDLC SERPL-MCNC: 43 MG/DL
HGB BLD-MCNC: 14.5 G/DL (ref 13.3–17.7)
LDLC SERPL CALC-MCNC: 106 MG/DL
MCH RBC QN AUTO: 29.7 PG (ref 26.5–33)
MCHC RBC AUTO-ENTMCNC: 32.4 G/DL (ref 31.5–36.5)
MCV RBC AUTO: 92 FL (ref 78–100)
PLATELET # BLD AUTO: 158 10E3/UL (ref 150–450)
POTASSIUM BLD-SCNC: 5.8 MMOL/L (ref 3.5–5)
PROT SERPL-MCNC: 7.8 G/DL (ref 6–8)
RBC # BLD AUTO: 4.88 10E6/UL (ref 4.4–5.9)
SODIUM SERPL-SCNC: 143 MMOL/L (ref 136–145)
TRIGL SERPL-MCNC: 152 MG/DL
VIT B12 SERPL-MCNC: 693 PG/ML (ref 213–816)
WBC # BLD AUTO: 7.4 10E3/UL (ref 4–11)

## 2021-09-29 PROCEDURE — 83036 HEMOGLOBIN GLYCOSYLATED A1C: CPT | Performed by: FAMILY MEDICINE

## 2021-09-29 PROCEDURE — 36415 COLL VENOUS BLD VENIPUNCTURE: CPT | Performed by: FAMILY MEDICINE

## 2021-09-29 PROCEDURE — 85027 COMPLETE CBC AUTOMATED: CPT | Performed by: FAMILY MEDICINE

## 2021-09-29 PROCEDURE — 82607 VITAMIN B-12: CPT | Performed by: FAMILY MEDICINE

## 2021-09-29 PROCEDURE — 99396 PREV VISIT EST AGE 40-64: CPT | Performed by: FAMILY MEDICINE

## 2021-09-29 PROCEDURE — 82306 VITAMIN D 25 HYDROXY: CPT | Performed by: FAMILY MEDICINE

## 2021-09-29 PROCEDURE — 80061 LIPID PANEL: CPT | Performed by: FAMILY MEDICINE

## 2021-09-29 PROCEDURE — 80053 COMPREHEN METABOLIC PANEL: CPT | Performed by: FAMILY MEDICINE

## 2021-09-29 ASSESSMENT — ASTHMA QUESTIONNAIRES
QUESTION_5 LAST FOUR WEEKS HOW WOULD YOU RATE YOUR ASTHMA CONTROL: WELL CONTROLLED
QUESTION_2 LAST FOUR WEEKS HOW OFTEN HAVE YOU HAD SHORTNESS OF BREATH: ONCE OR TWICE A WEEK
QUESTION_1 LAST FOUR WEEKS HOW MUCH OF THE TIME DID YOUR ASTHMA KEEP YOU FROM GETTING AS MUCH DONE AT WORK, SCHOOL OR AT HOME: A LITTLE OF THE TIME
ACT_TOTALSCORE: 20
QUESTION_3 LAST FOUR WEEKS HOW OFTEN DID YOUR ASTHMA SYMPTOMS (WHEEZING, COUGHING, SHORTNESS OF BREATH, CHEST TIGHTNESS OR PAIN) WAKE YOU UP AT NIGHT OR EARLIER THAN USUAL IN THE MORNING: ONCE OR TWICE
QUESTION_4 LAST FOUR WEEKS HOW OFTEN HAVE YOU USED YOUR RESCUE INHALER OR NEBULIZER MEDICATION (SUCH AS ALBUTEROL): ONCE A WEEK OR LESS

## 2021-09-29 ASSESSMENT — ANXIETY QUESTIONNAIRES
7. FEELING AFRAID AS IF SOMETHING AWFUL MIGHT HAPPEN: NOT AT ALL
GAD7 TOTAL SCORE: 0
5. BEING SO RESTLESS THAT IT IS HARD TO SIT STILL: NOT AT ALL
1. FEELING NERVOUS, ANXIOUS, OR ON EDGE: NOT AT ALL
3. WORRYING TOO MUCH ABOUT DIFFERENT THINGS: NOT AT ALL
IF YOU CHECKED OFF ANY PROBLEMS ON THIS QUESTIONNAIRE, HOW DIFFICULT HAVE THESE PROBLEMS MADE IT FOR YOU TO DO YOUR WORK, TAKE CARE OF THINGS AT HOME, OR GET ALONG WITH OTHER PEOPLE: NOT DIFFICULT AT ALL
2. NOT BEING ABLE TO STOP OR CONTROL WORRYING: NOT AT ALL
6. BECOMING EASILY ANNOYED OR IRRITABLE: NOT AT ALL

## 2021-09-29 ASSESSMENT — MIFFLIN-ST. JEOR: SCORE: 1938.41

## 2021-09-29 ASSESSMENT — PATIENT HEALTH QUESTIONNAIRE - PHQ9: 5. POOR APPETITE OR OVEREATING: NOT AT ALL

## 2021-09-29 NOTE — LETTER
My Asthma Action Plan    Name: Jack Logan   YOB: 1980  Date: 10/4/2021   My doctor: Amian Mittal MD   My clinic: [facility]        My Rescue Medicine:   Albuterol inhaler (Proair/Ventolin/Proventil HFA)  2-4 puffs EVERY 4 HOURS as needed. Use a spacer if recommended by your provider.   My Asthma Severity:   Intermittent / Exercise Induced  Know your asthma triggers: allergies  pollens          GREEN ZONE   Good Control    I feel good    No cough or wheeze    Can work, sleep and play without asthma symptoms       Take your asthma control medicine every day.     1. If exercise triggers your asthma, take your rescue medication    15 minutes before exercise or sports, and    During exercise if you have asthma symptoms  2. Spacer to use with inhaler: If you have a spacer, make sure to use it with your inhaler             YELLOW ZONE Getting Worse  I have ANY of these:    I do not feel good    Cough or wheeze    Chest feels tight    Wake up at night   1. Keep taking your Green Zone medications  2. Start taking your rescue medicine:    every 20 minutes for up to 1 hour. Then every 4 hours for 24-48 hours.  3. If you stay in the Yellow Zone for more than 12-24 hours, contact your doctor.  4. If you do not return to the Green Zone in 12-24 hours or you get worse, start taking your oral steroid medicine if prescribed by your provider.           RED ZONE Medical Alert - Get Help  I have ANY of these:    I feel awful    Medicine is not helping    Breathing getting harder    Trouble walking or talking    Nose opens wide to breathe       1. Take your rescue medicine NOW  2. If your provider has prescribed an oral steroid medicine, start taking it NOW  3. Call your doctor NOW  4. If you are still in the Red Zone after 20 minutes and you have not reached your doctor:    Take your rescue medicine again and    Call 911 or go to the emergency room right away    See your regular  doctor within 2 weeks of an Emergency Room or Urgent Care visit for follow-up treatment.          Annual Reminders:  Meet with Asthma Educator,  Flu Shot in the Fall, consider Pneumonia Vaccination for patients with asthma (aged 19 and older).    Pharmacy: [id number]  [place], [place] - [address] AT [facility] [address]    Electronically signed by Amina Mittal MD   Date: 10/04/21                    Asthma Triggers  How To Control Things That Make Your Asthma Worse    Triggers are things that make your asthma worse.  Look at the list below to help you find your triggers and   what you can do about them. You can help prevent asthma flare-ups by staying away from your triggers.      Trigger                                                          What you can do   Cigarette Smoke  Tobacco smoke can make asthma worse. Do not allow smoking in your home, car or around you.  Be sure no one smokes at a child s day care or school.  If you smoke, ask your health care provider for ways to help you quit.  Ask family members to quit too.  Ask your health care provider for a referral to Quit Plan to help you quit smoking, or call 7-418-708-PLAN.     Colds, Flu, Bronchitis  These are common triggers of asthma. Wash your hands often.  Don t touch your eyes, nose or mouth.  Get a flu shot every year.     Dust Mites  These are tiny bugs that live in cloth or carpet. They are too small to see. Wash sheets and blankets in hot water every week.   Encase pillows and mattress in dust mite proof covers.  Avoid having carpet if you can. If you have carpet, vacuum weekly.   Use a dust mask and HEPA vacuum.   Pollen and Outdoor Mold  Some people are allergic to trees, grass, or weed pollen, or molds. Try to keep your windows closed.  Limit time out doors when pollen count is high.   Ask you health care provider about taking medicine during allergy season.     Animal Dander  Some people are  allergic to skin flakes, urine or saliva from pets with fur or feathers. Keep pets with fur or feathers out of your home.    If you can t keep the pet outdoors, then keep the pet out of your bedroom.  Keep the bedroom door closed.  Keep pets off cloth furniture and away from stuffed toys.     Mice, Rats, and Cockroaches  Some people are allergic to the waste from these pests.   Cover food and garbage.  Clean up spills and food crumbs.  Store grease in the refrigerator.   Keep food out of the bedroom.   Indoor Mold  This can be a trigger if your home has high moisture. Fix leaking faucets, pipes, or other sources of water.   Clean moldy surfaces.  Dehumidify basement if it is damp and smelly.   Smoke, Strong Odors, and Sprays  These can reduce air quality. Stay away from strong odors and sprays, such as perfume, powder, hair spray, paints, smoke incense, paint, cleaning products, candles and new carpet.   Exercise or Sports  Some people with asthma have this trigger. Be active!  Ask your doctor about taking medicine before sports or exercise to prevent symptoms.    Warm up for 5-10 minutes before and after sports or exercise.     Other Triggers of Asthma  Cold air:  Cover your nose and mouth with a scarf.  Sometimes laughing or crying can be a trigger.  Some medicines and food can trigger asthma.

## 2021-09-29 NOTE — PROGRESS NOTES
Answers for HPI/ROS submitted by the patient on 2021  Frequency of exercise:: 2-3 days/week  Getting at least 3 servings of Calcium per day:: Yes  Diet:: Other  Taking medications regularly:: No  Medication side effects:: None  Bi-annual eye exam:: Yes  Dental care twice a year:: NO  Sleep apnea or symptoms of sleep apnea:: Sleep apnea  Additional concerns today:: No  Duration of exercise:: 30-45 minutes    SUBJECTIVE:   CC: Jack Logan is an 40 year old male who presents for preventative health visit.     Patient has been advised of split billing requirements and indicates understanding: Yes  HPI    He comes in today for a physical. His mother is adopted. His father is dead, his step-mother isn't talking much either. He does note that his father's family is not very close: PGF  of pancreatic cancer, had diabetes II, obese and was an aocholic, His P aunt Kelli alcohol, pancreatitis and  of pancreatic cancer, skin lesions. Dad  of nasopharyngeal cancer but had jaundice and had some alcoholism but quit years before. Pancreatitis and likely had pancreatic cancer as well. Fatty liver and diabetes II PGM:  of melanoma, had pancreatic cancer and was going through treatment when diagnosed with melanoma.     Mom: MGM had dementia, undiagnosed severe tremors. Mom has some blood clot diagnosis which she hasn't shared. She does have a h/o pancreatitis, anxiety, skin lesions.     He is walking 20,000 steps a day, clothes are fitting better. He is doing a lot of cardio and weight lifting. He does see derm every 6 months. He did have a lesion on right shin removed this was pre-cancer, the right thigh was a concerning mole as well and removed.     His right great toenail is not doing well. He did take an oral antifungal pill. He did see podiatry as well. He did do oral treatment and has had this removed a few times. He does note that he has not had good pain relief with prior toe nail removals.     He was  having chest pains, felt like a hot long needle going through the chest. He did tweak his back and went to PT and has been going to the chiropractor and was having some of the sharp pains there and he found out that he does not have a good back. He does think that this could be his back.     He is tired all the time. He has been this way as long as he can remember. Going through his familly history, he did have bad allergies as a kid. He did get diagnosed with sleep apnea and has been using this religiously and is sleeping better. He does wonder if he maybe had sleep apnea as a kid. He is still tired during the day as well. He does take Adderall for his adhd and this helps.     He does note that it takes longer to pee now than it used to. He does not have pain with this. He does have minimal pain with urination. He does note some increased urinary frequency. Some urgency as well as dribbling.     Asthma Follow-Up    Was ACT completed today?    Yes    ACT Total Scores 9/29/2021   ACT TOTAL SCORE (Goal Greater than or Equal to 20) 20   In the past 12 months, how many times did you visit the emergency room for your asthma without being admitted to the hospital? 0   In the past 12 months, how many times were you hospitalized overnight because of your asthma? 0          How many days per week do you miss taking your asthma controller medication?  0    Please describe any recent triggers for your asthma: pollens    Have you had any Emergency Room Visits, Urgent Care Visits, or Hospital Admissions since your last office visit?  No      Today's PHQ-2 Score:   PHQ-2 ( 1999 Pfizer) 9/29/2021   Q1: Little interest or pleasure in doing things 0   Q2: Feeling down, depressed or hopeless 0   PHQ-2 Score 0   Q1: Little interest or pleasure in doing things -   Q2: Feeling down, depressed or hopeless -   PHQ-2 Score -       Abuse: Current or Past(Physical, Sexual or Emotional)- No  Do you feel safe in your environment? Yes    Have  "you ever done Advance Care Planning? (For example, a Health Directive, POLST, or a discussion with a medical provider or your loved ones about your wishes): No, advance care planning information given to patient to review.  Patient plans to discuss their wishes with loved ones or provider.      Social History     Tobacco Use     Smoking status: Never Smoker     Smokeless tobacco: Never Used   Substance Use Topics     Alcohol use: Yes     Alcohol/week: 2.5 standard drinks     Comment: Alcoholic Drinks/day: Social      If you drink alcohol do you typically have >3 drinks per day or >7 drinks per week? No    No flowsheet data found.    Last PSA: No results found for: PSA    Reviewed orders with patient. Reviewed health maintenance and updated orders accordingly - Yes      Reviewed and updated as needed this visit by clinical staff  Tobacco  Allergies  Meds  Problems  Med Hx  Surg Hx  Fam Hx          Reviewed and updated as needed this visit by Provider  Tobacco  Allergies  Meds  Problems  Med Hx  Surg Hx  Fam Hx           Review of Systems  With the exception of the aforementioned issues, 12 point, comprehensive ROS was done and was negative.     OBJECTIVE:   /68 (BP Location: Right arm, Patient Position: Sitting, Cuff Size: Adult Large)   Pulse 72   Resp 17   Ht 1.765 m (5' 9.5\")   Wt 104 kg (229 lb 4.8 oz)   BMI 33.38 kg/m      Physical Exam  GENERAL: healthy, alert and no distress  EYES: Eyes grossly normal to inspection, PERRL and conjunctivae and sclerae normal  HENT: ear canals and TM's normal, nose and mouth without ulcers or lesions  NECK: no adenopathy, no asymmetry, masses, or scars and thyroid normal to palpation  RESP: lungs clear to auscultation - no rales, rhonchi or wheezes  CV: regular rate and rhythm, normal S1 S2, no S3 or S4, no murmur, click or rub, no peripheral edema and peripheral pulses strong  ABDOMEN: soft, nontender, no hepatosplenomegaly, no masses and bowel sounds " normal  RECTAL: normal sphincter tone, no rectal masses, prostate normal size, smooth, nontender without nodules or masses  RECTAL: stool in rectum making exam challenging  MS: no gross musculoskeletal defects noted, no edema, right great toenail with discoloration, thickening  SKIN: no suspicious lesions or rashes  NEURO: Normal strength and tone, mentation intact and speech normal  PSYCH: mentation appears normal, affect normal/bright, speech is direct, thought process and speech pattern is normal      ASSESSMENT/PLAN:   Jack was seen today for physical and nail problem.    Diagnoses and all orders for this visit:    Encounter for routine history and physical examination of adult   Routine health maintenance discussion:  No smoking, limited alcohol (7 or less servings per week), 5 fruits/veg servings per day, 200 minutes of exercise per week.  Daily calcium/vitamin D guidelines, bone health, colon cancer screening beginning at age 50.  Accident avoidance, sun screen.     Fatty liver  -     Comprehensive metabolic panel (BMP + Alb, Alk Phos, ALT, AST, Total. Bili, TP); Future  -     Adult Gastro Ref - Consult Only; Future  -     Comprehensive metabolic panel (BMP + Alb, Alk Phos, ALT, AST, Total. Bili, TP)   Prior patient of [facility] with last FibroScan in 2017 showing fibrosis.  Will refer back to [facility] for further evaluation and assessment.  Updating lab work today.    Autism   [facility]  Coping well with this.    Hyperlipidemia, unspecified hyperlipidemia type   Updating labs today    Other fatigue  -     CBC with platelets; Future  -     Vitamin D deficiency screening; Future  -     Vitamin B12; Future  -     CBC with platelets  -     Vitamin D deficiency screening  -     Vitamin B12   -Discussed good sleep hygiene as well as exercise.  Updating basic labs as listed below as well.  If normal and things are not improving with exercise and weight loss he will let me  "know    Urinary hesitancy   -Discussed increasing his fluids and taking his time when using the restroom.  If not improving with this, he will let me know and I can refer to urology for further evaluation and assessment.    Onychocryptosis  -     Orthopedic  Referral; Future   Referral to podiatry placed today.  Patient has tried oral and topical treatment without good results.  Due to discomfort because of the nail size would be interested in removal.    Family history of diabetes mellitus  -     Hemoglobin A1c; Future  -     Hemoglobin A1c    Lipid screening  -     Lipid panel reflex to direct LDL Fasting; Future  -     Lipid panel reflex to direct LDL Fasting    Family history of pancreatic cancer  -     Cancer Risk Mgmt/Cancer Genetic Counseling Referral; Future  - Patient with significant family history especially of pancreatic cancer.  Referral placed to genetic counseling for further evaluation and assessment.        Patient has been advised of split billing requirements and indicates understanding: Yes  COUNSELING:   Reviewed preventive health counseling, as reflected in patient instructions       Regular exercise       Healthy diet/nutrition       Colon cancer screening       Prostate cancer screening    Estimated body mass index is 33.38 kg/m  as calculated from the following:    Height as of this encounter: 1.765 m (5' 9.5\").    Weight as of this encounter: 104 kg (229 lb 4.8 oz).     Weight management plan: Discussed healthy diet and exercise guidelines    He reports that he has never smoked. He has never used smokeless tobacco.      Counseling Resources:  ATP IV Guidelines  Pooled Cohorts Equation Calculator  FRAX Risk Assessment  ICSI Preventive Guidelines  Dietary Guidelines for Americans, 2010  USDA's MyPlate  ASA Prophylaxis  Lung CA Screening    Amina Mittal MD  [facility]  "

## 2021-09-30 LAB — DEPRECATED CALCIDIOL+CALCIFEROL SERPL-MC: 22 UG/L (ref 30–80)

## 2021-09-30 ASSESSMENT — ANXIETY QUESTIONNAIRES: GAD7 TOTAL SCORE: 0

## 2021-09-30 ASSESSMENT — ASTHMA QUESTIONNAIRES: ACT_TOTALSCORE: 20

## 2021-10-04 PROBLEM — F31.89 OTHER BIPOLAR DISORDER (H): Status: ACTIVE | Noted: 2021-10-04

## 2021-10-04 PROBLEM — F84.5 ASPERGER'S DISORDER: Status: ACTIVE | Noted: 2021-09-29

## 2021-10-04 PROBLEM — N52.9 ERECTILE DYSFUNCTION: Status: ACTIVE | Noted: 2021-10-04

## 2021-10-04 PROBLEM — G47.33 OBSTRUCTIVE SLEEP APNEA OF ADULT: Status: ACTIVE | Noted: 2021-10-04

## 2021-10-04 PROBLEM — K70.0 ALCOHOLIC FATTY LIVER: Status: ACTIVE | Noted: 2017-10-30

## 2021-10-04 PROBLEM — F90.0 ATTENTION DEFICIT HYPERACTIVITY DISORDER (ADHD), PREDOMINANTLY INATTENTIVE TYPE: Status: ACTIVE | Noted: 2021-10-04

## 2021-10-04 PROBLEM — F39 MOOD DISORDER (H): Status: ACTIVE | Noted: 2021-10-04

## 2021-10-05 ENCOUNTER — TRANSFERRED RECORDS (OUTPATIENT)
Dept: HEALTH INFORMATION MANAGEMENT | Facility: CLINIC | Age: 41
End: 2021-10-05

## 2021-10-06 ENCOUNTER — TELEPHONE (OUTPATIENT)
Dept: FAMILY MEDICINE | Facility: CLINIC | Age: 41
End: 2021-10-06

## 2021-10-26 ENCOUNTER — HOSPITAL ENCOUNTER (OUTPATIENT)
Dept: MRI IMAGING | Facility: CLINIC | Age: 41
End: 2021-10-26
Attending: INTERNAL MEDICINE
Payer: COMMERCIAL

## 2021-10-26 DIAGNOSIS — Z80.0 FAMILY HISTORY OF PANCREATIC CANCER: ICD-10-CM

## 2021-10-26 DIAGNOSIS — K76.0 FATTY LIVER: ICD-10-CM

## 2021-10-26 PROCEDURE — 74183 MRI ABD W/O CNTR FLWD CNTR: CPT

## 2021-10-26 PROCEDURE — 255N000002 HC RX 255 OP 636: Performed by: INTERNAL MEDICINE

## 2021-10-26 PROCEDURE — A9585 GADOBUTROL INJECTION: HCPCS | Performed by: INTERNAL MEDICINE

## 2021-10-26 RX ORDER — GADOBUTROL 604.72 MG/ML
0.1 INJECTION INTRAVENOUS ONCE
Status: COMPLETED | OUTPATIENT
Start: 2021-10-26 | End: 2021-10-26

## 2021-10-26 RX ADMIN — GADOBUTROL 10 ML: 604.72 INJECTION INTRAVENOUS at 18:53

## 2021-10-28 ENCOUNTER — TRANSFERRED RECORDS (OUTPATIENT)
Dept: HEALTH INFORMATION MANAGEMENT | Facility: CLINIC | Age: 41
End: 2021-10-28
Payer: COMMERCIAL

## 2021-11-17 ENCOUNTER — MYC MEDICAL ADVICE (OUTPATIENT)
Dept: FAMILY MEDICINE | Facility: CLINIC | Age: 41
End: 2021-11-17
Payer: COMMERCIAL

## 2021-11-17 DIAGNOSIS — R39.11 URINARY HESITANCY: Primary | ICD-10-CM

## 2021-11-18 NOTE — TELEPHONE ENCOUNTER
9/29/21 visits:  Urinary hesitancy              -Discussed increasing his fluids and taking his time when using the restroom.  If not improving with this, he will let me know and I can refer to urology for further evaluation and assessment        Will send to PCP for review.

## 2021-11-23 ENCOUNTER — PREP FOR PROCEDURE (OUTPATIENT)
Dept: PODIATRY | Facility: CLINIC | Age: 41
End: 2021-11-23

## 2021-11-23 ENCOUNTER — OFFICE VISIT (OUTPATIENT)
Dept: PODIATRY | Facility: CLINIC | Age: 41
End: 2021-11-23
Payer: COMMERCIAL

## 2021-11-23 VITALS — SYSTOLIC BLOOD PRESSURE: 128 MMHG | HEART RATE: 104 BPM | TEMPERATURE: 97.8 F | DIASTOLIC BLOOD PRESSURE: 72 MMHG

## 2021-11-23 DIAGNOSIS — B35.1 ONYCHOMYCOSIS: Primary | ICD-10-CM

## 2021-11-23 PROCEDURE — 99203 OFFICE O/P NEW LOW 30 MIN: CPT | Performed by: PODIATRIST

## 2021-11-23 RX ORDER — LOXAPINE SUCCINATE 10 MG/1
TABLET ORAL
COMMUNITY
Start: 2021-10-25 | End: 2022-07-05

## 2021-11-23 RX ORDER — RISPERIDONE 1 MG/1
TABLET ORAL
COMMUNITY
Start: 2021-10-22 | End: 2021-12-15

## 2021-11-23 NOTE — LETTER
11/23/2021         RE: Jack Logan  [address]  [facility] [address]        Dear Colleague,    Thank you for referring your patient, Jack Logan, to the [facility]. Please see a copy of my visit note below.          FOOT AND ANKLE SURGERY/PODIATRY CONSULT NOTE         ASSESSMENT:   Onychomycosis  Bipolar Disorder      TREATMENT:  -There are dystrophic changes along nails on digits 1,4,5 on the right foot. I discussed topical and oral anti-fungal medication today including possible elevated LFT's with oral medication.     -Because he has tried oral medication with no success, he would like to have the nails permanently removed. I reviewed the permanent nail avulsion procedure today, 2% chance of re-growth. He has had poor reaction to this procedure in-office and requests sedation.     -Patient's questions invited and answered. I will ask my office to coordinate surgery at [facility]. He was encouraged to call my office with any further questions or concerns.     Yung Merchant DPM  [facility] Podiatry/Foot & Ankle Surgery  [phone]      HPI: I was asked to see Jack Logan today for nail fungus concerns on his right foot. The patient states he recently finished a course of oral terbinafine and has not seen improvement in the quality of the nails on the right foot. He has previously undergone surgical matrixectomy with Dr. Cruz.       Past Medical History:   Diagnosis Date     ADHD (attention deficit hyperactivity disorder)      ADHD (attention deficit hyperactivity disorder)      Allergic rhinitis, cause unspecified      Allergic rhinitis, cause unspecified      Anxiety      Anxiety      Asthma      Asthma      Cystic fibrosis carrier      Cystic fibrosis carrier      Depression      Depression      Depressive disorder     For as long as I can remember     Hearing loss      Hearing loss      Obesity      Obesity      Onychomycosis       Onychomycosis          Social History     Socioeconomic History     Marital status:      Spouse name: Not on file     Number of children: Not on file     Years of education: Not on file     Highest education level: Not on file   Occupational History     Not on file   Tobacco Use     Smoking status: Never Smoker     Smokeless tobacco: Never Used   Substance and Sexual Activity     Alcohol use: Yes     Alcohol/week: 2.5 standard drinks     Comment: Alcoholic Drinks/day: Social      Drug use: No     Sexual activity: Yes     Partners: Female   Other Topics Concern     Parent/sibling w/ CABG, MI or angioplasty before 65F 55M? No   Social History Narrative     Not on file     Social Determinants of Health     Financial Resource Strain: Not on file   Food Insecurity: Not on file   Transportation Needs: Not on file   Physical Activity: Not on file   Stress: Not on file   Social Connections: Not on file   Intimate Partner Violence: Not on file   Housing Stability: Not on file            Allergies   Allergen Reactions     Cats Hives and Shortness Of Breath     Dust Mite Extract Cough and Itching     Pollen Extract Rash         MEDICATIONS:   Current Outpatient Medications   Medication     albuterol (PROAIR HFA;PROVENTIL HFA;VENTOLIN HFA) 90 mcg/actuation inhaler     cetirizine (ZYRTEC) 10 MG tablet     desonide (DESOWEN) 0.05 % ointment     dextroamphetamine-amphetamine (ADDERALL) 10 mg Tab tablet     fluticasone (FLONASE) 50 mcg/actuation nasal spray     ketoconazole (NIZORAL) 2 % cream     LORazepam (ATIVAN) 0.5 MG tablet     loxapine (LOXITANE) 10 MG capsule     risperiDONE (RISPERDAL) 1 MG tablet     traZODone (DESYREL) 50 MG tablet     venlafaxine (EFFEXOR) 75 MG tablet     No current facility-administered medications for this visit.        Family History   Problem Relation Age of Onset     Melanoma Mother      Autism Spectrum Disorder Mother      Depression Mother      Anxiety Disorder Mother      Asthma  Mother      Bipolar Disorder Mother      Chronic Obstructive Pulmonary Disease Mother      Bipolar Disorder Father      Cancer Father 52        nasopharyngeal     Diabetes Father      Hypertension Father      Hyperlipidemia Father      Depression Father      Anxiety Disorder Father      Obesity Father      Pancreatic Cancer Father      Pancreatic Cancer Maternal Grandmother      Parkinsonism Maternal Grandmother      Thrombocytopenia Maternal Grandmother      Unknown/Adopted Maternal Grandfather      Glaucoma Paternal Grandmother      Pancreatic Cancer Paternal Grandmother         diagnosed in her 60s-70s     Melanoma Paternal Grandmother         diagnosed in her 60s-70s     Pancreatic Cancer Paternal Grandfather         possible diagnosis     Diabetes Paternal Grandfather      Hypertension Paternal Grandfather      Hyperlipidemia Paternal Grandfather      Cystic Fibrosis Daughter      Pancreatic Cancer Paternal Aunt         diagnosed in her 50s-60s     Cancer Paternal Uncle         nasopharyngeal cancer, diagnosed in his 50s-60s     Colon Cancer Cousin         paternal first-cousin, prior to age 50     Leukemia Cousin         paternal first-cousin, in childhood     Pancreatic Cancer Other      Stomach Cancer Other         great-aunt (paternal grandmother's sister)          Review of Systems - 10 point Review of Systems is negative except for fungal nails which is noted in HPI.    OBJECTIVE:  Appearance: alert, well appearing, and in no distress.    VITAL SIGNS: /72   Pulse 104   Temp 97.8  F (36.6  C)       General appearance: Patient is alert and fully cooperative with history & exam.  No sign of distress is noted during the visit.     Psychiatric: Affect is pleasant & appropriate.  Patient appears motivated to improve health.     Respiratory: Breathing is regular & unlabored while sitting.     HEENT: Hearing is intact to spoken word.  Speech is clear.  No gross evidence of visual impairment that would  impact ambulation.      Vascular: Dorsalis pedis and posterior tibial pulses are palpable. There is pedal hair growth right.  CFT < 3 sec from anterior tibial surface to distal digits right. There is no appreciable edema noted.  Dermatologic: Dystrophic nails 1,4,5 right foot. No erythema right foot.  Neurologic: All epicritic and proprioceptive sensations are grossly intact right.  Musculoskeletal: Contracted digits right foot.             Again, thank you for allowing me to participate in the care of your patient.        Sincerely,        Yung Merchant DPM

## 2021-11-23 NOTE — PATIENT INSTRUCTIONS
"    Jack    Your visit to [facility] for your surgery or procedure is coming soon and we look forward to seeing you! This friendly reminder and pre-procedure checklist will help to ensure your procedure/surgery goes smoothly and meets your expectations. At [facility], our goal is to provide you with a great patient experience and to deliver genuine, professional care to every patient.     Please complete all the steps in advance of your visit. If you have any questions about the items listed below, please give our office a call. We can be reached at [phone] or visit our website at [email or web address]  for more information.       YOU NEED A PRE-OPERATIVE PHYSICAL PRIOR TO SURGERY.  PLEASE CALL YOUR PRIMARY CARE PROVIDER TODAY TO SCHEDULE.    Procedure Date :  TBD    A nurse will call you the day before surgery to inform you of the time of surgery.    Covid Test: SEE APPOINTMENTS    Post Operative Appointment: SEE APPOINTMENTS     Admission Type: Outpatient    Surgeon: Dr. Merchant    Procedure: total permanent nail excision, 1st 4th & 5th toes right foot    Procedure Location: [facility]:  [address] [place] [address] (Fax: [phone])    IF YOU NEED TO RESCHEDULE OR CANCEL YOUR PROCEDURE FOR ANY REASON PLEASE CALL THE CLINIC AS SOON AS POSSIBLE [phone].    Complete the following checklist before your procedure/surgery    [] Contact your insurance regarding coverage    If you would like a [facility] Estimate for your upcoming service/procedure at [facility] Location contact Cost of Care Estimates at [phone], advocates are available Monday through Friday 8am - 5pm.   You may also submit a request online at [email or web address]   - complete the secure online form found under \"Services and Procedure Pricing\"     If your procedure is at [facility] please contact the " numbers below for Cost of Care Estimates.   Facility Charge: [phone]    Anesthesiology charge:  [phone]        []  You will need a preop physical within 30 days before surgery with your primary care provider. This exam is required by the anesthesiologist to ensure a safe surgical experience.    Failure  to obtain your pre-op physical will lead to cancellation of your surgery  Please contact your primary to schedule. Please ensure your Preoperative Physical is faxed to the Hospital (numbers listed above)    [] Preoperative Medication Instructions    Stop Ibuprofen 1 week prior to surgery.      Contact your prescribing provider for instructions before discontinuing any medications including anticoagulants. (Examples: Coumadin, Heparin, Xarelto, Eliquis, Pradaxa, Effiient, Briliant) We would like you stop them five days before surgery HOWEVER, please follow the advice of your primary care provider. Most surgeons will have you remain on your aspirin and Plavix.    Hold Herbal Supplements and Vitamin E 7 days before    Stop Cialis, Levitra and Viagra 2-3 days before surgery    [] Fasting Requirements    Do not eat anything after 11:00 pm    You may have clear liquids up to two hours before your arrival time (coffee, tea, water, or Gatorade. No cream or milk)    If your surgery is scheduled later in the day, fasting instructions may be modified.    If your primary care provider has instructed you to continue oral medications, you may take them on the morning of surgery with a small sip of water.      No alcohol or smoking after 12:00am the day of surgery    Day Before Surgery    [] A surgery nurse will call you 2 -3 days prior to your surgery to review your health history and provide detailed instructions.     [] STOP Smoking and Drinking: It is important to stop smoking and drinking at least 24 hours before surgery. Smoking and drinking may cause complications in your recovery from anesthesia and may lengthen  the healing process.    [] Pack for your hospital stay: If it will be required for you to stay at the hospital after surgery, bring personal items such as a robe, slippers, pajamas, additional clothing and toiletries. Don't forget:    List of medication    Eyeglass case or contact case with solution. You cannot wear contacts during surgery    Copies of your physical exam , lab results and EKG    Copy of Health Care Directives, Living Will or Power of     Insurance Cards    Photo ID    CPAP machine    [] NOTHING BY MOUTH 8 HOURS BEFORE. This includes gum, hard candy, water and mints. The only exception is if you have been instructed by your doctor to take your medications with sips of water. You may rinse your mouth or brush your teeth, but do not swallow water.        Day of Surgery    [] Medications- Take as indicated with sips of water     [] Wear comfortable loose fitting clothes. Wear your glasses-Not contacts. Do not wear jewelry and remove body piercing's. Surgery may be cancelled if they are not removed.     [] If same day surgery-Have a someone come with you to surgery that can help you understand the surgeon's instructions, drive you home and stay with you overnight the first night.   A nurse will call you at home within 72 hours following surgery to see how you are doing. Our nurses and doctors will discuss recovery with you.    [] DO NOT BRING FMLA WITH TO SURGERY.  These should be sent to the provider's office by fax to [phone]       After Surgery    [] During surgery Dr. eMrchant will apply a gauze dressing to your foot.     Post Nail Excision Instructions      Keep bandages clean, dry, and intact for the rest of the day of surgery.     The day after surgery, remove all bandages    Soak foot in warm water with Epsom Salt for 10 minutes    Dry feet thoroughly     Apply a Band-Aid to the affected area    Continue this process daily for the next two weeks following the procedure    General  bathing does not replace daily foot soaks    Do not anticipate severe pain. But if you do experience some discomfort, you can take Ibuprofen (Advil)    You can expect some drainage and weeping from the area. This may last anywhere from several days to several weeks. This is NORMAL.    If you experience any increase in pain, any swelling, or odor call our office immediately. As this may be a sign of infection.    If you have any questions in the meantime, please do not hesitate to call Podiatry at [phone]       Notify our office right away, if you have any changes in your health status, or if you develop a cold, flu, diarrhea, infection, fever or sore throat before your scheduled surgery date. We can be reached at [phone] Monday-Friday 8 am-4:30 pm if you have any questions.   Thank you for choosing [facility]  If you have any questions or need to reschedule your appointment, please call [phone]          Before Your Procedure or Hospital Admission  Testing for COVID-19 (Coronavirus)    Thank you for choosing [facility] for your health care needs. The COVID-19 pandemic is a very challenging time for everyone. Our goal is to keep you and our team here at [facility] safe and healthy.       Before you come in, All patients must get tested for COVID-19. Your test needs to happen 2 to 4 days before you check in to the hospital or surgery site.    A clinic scheduler will call about a week in advance to set up a testing time at one of our labs. We ll take a swab of your nose or throat.    Note: If you go to a clinic or pharmacy like [facility] or [facility] for your test, make sure you get a test inside the nose. Tests inside the nose are:  - A naso-pharyngeal (NP) RT-PCR test  - An anterior nares RT-PCR test    Do NOT get a  rapid  test or a saliva (spit) test.    After the test, please stay at home and stay out of contact with other people. It will be harder for you to recover if you  get COVID-19 before your treatment.    Please follow all current safety guidelines, including:    Limit trips outside your home.    Limit the number of people you see.    Always wear a mask outside your home.    Use social distancing. Stay 6 feet away from others whenever you can.    Wash your hands often.    If your test shows you have COVID-19  If your test is positive, we ll let you know. A positive test means that you have the virus.  We ll probably have to postpone your admission, surgery or procedure. Your doctor will discuss this with you. After that, we ll let you know what to do and when you can re-schedule.  We may need to cancel your treatment on short notice for other reasons, too.    If your test shows you DON T have COVID-19  Even if your test is negative, you can still get COVID-19. It s rare but, sometimes, the test result is wrong. You could also catch the virus after taking the test.  There s a very small chance that you could catch COVID-19 in the hospital or surgery center.    Day of your surgery or procedure    Please come wearing a face covering that covers both your nose and mouth.    When you arrive, we ll ask you some questions to find out if you have any signs or symptoms of COVID-19.    Ask your care team if you can have visitors. All visitors must wear face coverings and will be screened for signs of COVID-19.    Even if no visitors are allowed, you can still have with you:  - Your legal guardian or legal decision maker  - A parent and one other visitor, if you are  younger than 18 years old  - A partner and a , if you are in labor    We might need to teach you about taking care of yourself after surgery. If so, a visitor can come into the hospital to learn about it, too.    The rules for visitors change often, depending on how much the virus is spreading. To learn more, see Visiting a Loved One in the Hospital during the COVID-19 Outbreak. Please call your care team, hospital or  surgery center if you have any questions. We thank you for your understanding and for choosing [facility] for your care.    Questions and Answers  Does it matter where I get tested for COVID-19?  Yes. We urge you to get tested at one of our [facility] COVID-19 testing sites. We process these tests in our lab and can get the results quickly. Your [facility] care team needs to get your results before you check in.    What should I do if I can t get tested at [facility]?  You can get tested somewhere else, but you ll need to take these extra steps:  1. Contact your family doctor or clinic to arrange your test.  2. Take the test within 4 days of your surgery or procedure. We can t accept tests older than 4 days.  3. Make sure you re getting a test inside the nose.  Tests inside the nose are:  - A naso-pharyngeal (NP) RT-PCR test  - An anterior nares RT-PCR test  -Many pharmacies use  rapid  tests or saliva (spit) tests. We do NOT accept those tests before surgery or procedures. Tests from inside the nose are the most accurate tests.  4. Make sure your doctor or clinic faxes your results to [facility] at [phone].    If we don t get your results in time, we may have to  delay or cancel your treatment.      For informational purposes only. Not to replace the advice of your health care provider. Copyright   2020 [facility]. All rights reserved. Clinically reviewed by Infection Prevention and the [facility] COVID-19 Clinical Team.  [employer] 262172 - Rev 09/09/21.

## 2021-11-23 NOTE — PROGRESS NOTES
FOOT AND ANKLE SURGERY/PODIATRY CONSULT NOTE         ASSESSMENT:   Onychomycosis  Bipolar Disorder      TREATMENT:  -There are dystrophic changes along nails on digits 1,4,5 on the right foot. I discussed topical and oral anti-fungal medication today including possible elevated LFT's with oral medication.     -Because he has tried oral medication with no success, he would like to have the nails permanently removed. I reviewed the permanent nail avulsion procedure today, 2% chance of re-growth. He has had poor reaction to this procedure in-office and requests sedation.     -Patient's questions invited and answered. I will ask my office to coordinate surgery at [facility]. He was encouraged to call my office with any further questions or concerns.     Yung Merchant DPM  [facility] Podiatry/Foot & Ankle Surgery  [phone]      HPI: I was asked to see Jack Logan today for nail fungus concerns on his right foot. The patient states he recently finished a course of oral terbinafine and has not seen improvement in the quality of the nails on the right foot. He has previously undergone surgical matrixectomy with Dr. Cruz.       Past Medical History:   Diagnosis Date     ADHD (attention deficit hyperactivity disorder)      ADHD (attention deficit hyperactivity disorder)      Allergic rhinitis, cause unspecified      Allergic rhinitis, cause unspecified      Anxiety      Anxiety      Asthma      Asthma      Cystic fibrosis carrier      Cystic fibrosis carrier      Depression      Depression      Depressive disorder     For as long as I can remember     Hearing loss      Hearing loss      Obesity      Obesity      Onychomycosis      Onychomycosis          Social History     Socioeconomic History     Marital status:      Spouse name: Not on file     Number of children: Not on file     Years of education: Not on file     Highest education level: Not on file   Occupational History      Not on file   Tobacco Use     Smoking status: Never Smoker     Smokeless tobacco: Never Used   Substance and Sexual Activity     Alcohol use: Yes     Alcohol/week: 2.5 standard drinks     Comment: Alcoholic Drinks/day: Social      Drug use: No     Sexual activity: Yes     Partners: Female   Other Topics Concern     Parent/sibling w/ CABG, MI or angioplasty before 65F 55M? No   Social History Narrative     Not on file     Social Determinants of Health     Financial Resource Strain: Not on file   Food Insecurity: Not on file   Transportation Needs: Not on file   Physical Activity: Not on file   Stress: Not on file   Social Connections: Not on file   Intimate Partner Violence: Not on file   Housing Stability: Not on file            Allergies   Allergen Reactions     Cats Hives and Shortness Of Breath     Dust Mite Extract Cough and Itching     Pollen Extract Rash         MEDICATIONS:   Current Outpatient Medications   Medication     albuterol (PROAIR HFA;PROVENTIL HFA;VENTOLIN HFA) 90 mcg/actuation inhaler     cetirizine (ZYRTEC) 10 MG tablet     desonide (DESOWEN) 0.05 % ointment     dextroamphetamine-amphetamine (ADDERALL) 10 mg Tab tablet     fluticasone (FLONASE) 50 mcg/actuation nasal spray     ketoconazole (NIZORAL) 2 % cream     LORazepam (ATIVAN) 0.5 MG tablet     loxapine (LOXITANE) 10 MG capsule     risperiDONE (RISPERDAL) 1 MG tablet     traZODone (DESYREL) 50 MG tablet     venlafaxine (EFFEXOR) 75 MG tablet     No current facility-administered medications for this visit.        Family History   Problem Relation Age of Onset     Melanoma Mother      Autism Spectrum Disorder Mother      Depression Mother      Anxiety Disorder Mother      Asthma Mother      Bipolar Disorder Mother      Chronic Obstructive Pulmonary Disease Mother      Bipolar Disorder Father      Cancer Father 52        nasopharyngeal     Diabetes Father      Hypertension Father      Hyperlipidemia Father      Depression Father      Anxiety  Disorder Father      Obesity Father      Pancreatic Cancer Father      Pancreatic Cancer Maternal Grandmother      Parkinsonism Maternal Grandmother      Thrombocytopenia Maternal Grandmother      Unknown/Adopted Maternal Grandfather      Glaucoma Paternal Grandmother      Pancreatic Cancer Paternal Grandmother         diagnosed in her 60s-70s     Melanoma Paternal Grandmother         diagnosed in her 60s-70s     Pancreatic Cancer Paternal Grandfather         possible diagnosis     Diabetes Paternal Grandfather      Hypertension Paternal Grandfather      Hyperlipidemia Paternal Grandfather      Cystic Fibrosis Daughter      Pancreatic Cancer Paternal Aunt         diagnosed in her 50s-60s     Cancer Paternal Uncle         nasopharyngeal cancer, diagnosed in his 50s-60s     Colon Cancer Cousin         paternal first-cousin, prior to age 50     Leukemia Cousin         paternal first-cousin, in childhood     Pancreatic Cancer Other      Stomach Cancer Other         great-aunt (paternal grandmother's sister)          Review of Systems - 10 point Review of Systems is negative except for fungal nails which is noted in HPI.    OBJECTIVE:  Appearance: alert, well appearing, and in no distress.    VITAL SIGNS: /72   Pulse 104   Temp 97.8  F (36.6  C)       General appearance: Patient is alert and fully cooperative with history & exam.  No sign of distress is noted during the visit.     Psychiatric: Affect is pleasant & appropriate.  Patient appears motivated to improve health.     Respiratory: Breathing is regular & unlabored while sitting.     HEENT: Hearing is intact to spoken word.  Speech is clear.  No gross evidence of visual impairment that would impact ambulation.      Vascular: Dorsalis pedis and posterior tibial pulses are palpable. There is pedal hair growth right.  CFT < 3 sec from anterior tibial surface to distal digits right. There is no appreciable edema noted.  Dermatologic: Dystrophic nails 1,4,5  right foot. No erythema right foot.  Neurologic: All epicritic and proprioceptive sensations are grossly intact right.  Musculoskeletal: Contracted digits right foot.

## 2021-11-23 NOTE — H&P (VIEW-ONLY)
FOOT AND ANKLE SURGERY/PODIATRY CONSULT NOTE         ASSESSMENT:   Onychomycosis  Bipolar Disorder      TREATMENT:  -There are dystrophic changes along nails on digits 1,4,5 on the right foot. I discussed topical and oral anti-fungal medication today including possible elevated LFT's with oral medication.     -Because he has tried oral medication with no success, he would like to have the nails permanently removed. I reviewed the permanent nail avulsion procedure today, 2% chance of re-growth. He has had poor reaction to this procedure in-office and requests sedation.     -Patient's questions invited and answered. I will ask my office to coordinate surgery at [facility]. He was encouraged to call my office with any further questions or concerns.     Yung Merchant DPM  [facility] Podiatry/Foot & Ankle Surgery  [phone]      HPI: I was asked to see Jack Logan today for nail fungus concerns on his right foot. The patient states he recently finished a course of oral terbinafine and has not seen improvement in the quality of the nails on the right foot. He has previously undergone surgical matrixectomy with Dr. Cruz.       Past Medical History:   Diagnosis Date     ADHD (attention deficit hyperactivity disorder)      ADHD (attention deficit hyperactivity disorder)      Allergic rhinitis, cause unspecified      Allergic rhinitis, cause unspecified      Anxiety      Anxiety      Asthma      Asthma      Cystic fibrosis carrier      Cystic fibrosis carrier      Depression      Depression      Depressive disorder     For as long as I can remember     Hearing loss      Hearing loss      Obesity      Obesity      Onychomycosis      Onychomycosis          Social History     Socioeconomic History     Marital status:      Spouse name: Not on file     Number of children: Not on file     Years of education: Not on file     Highest education level: Not on file   Occupational History      Not on file   Tobacco Use     Smoking status: Never Smoker     Smokeless tobacco: Never Used   Substance and Sexual Activity     Alcohol use: Yes     Alcohol/week: 2.5 standard drinks     Comment: Alcoholic Drinks/day: Social      Drug use: No     Sexual activity: Yes     Partners: Female   Other Topics Concern     Parent/sibling w/ CABG, MI or angioplasty before 65F 55M? No   Social History Narrative     Not on file     Social Determinants of Health     Financial Resource Strain: Not on file   Food Insecurity: Not on file   Transportation Needs: Not on file   Physical Activity: Not on file   Stress: Not on file   Social Connections: Not on file   Intimate Partner Violence: Not on file   Housing Stability: Not on file            Allergies   Allergen Reactions     Cats Hives and Shortness Of Breath     Dust Mite Extract Cough and Itching     Pollen Extract Rash         MEDICATIONS:   Current Outpatient Medications   Medication     albuterol (PROAIR HFA;PROVENTIL HFA;VENTOLIN HFA) 90 mcg/actuation inhaler     cetirizine (ZYRTEC) 10 MG tablet     desonide (DESOWEN) 0.05 % ointment     dextroamphetamine-amphetamine (ADDERALL) 10 mg Tab tablet     fluticasone (FLONASE) 50 mcg/actuation nasal spray     ketoconazole (NIZORAL) 2 % cream     LORazepam (ATIVAN) 0.5 MG tablet     loxapine (LOXITANE) 10 MG capsule     risperiDONE (RISPERDAL) 1 MG tablet     traZODone (DESYREL) 50 MG tablet     venlafaxine (EFFEXOR) 75 MG tablet     No current facility-administered medications for this visit.        Family History   Problem Relation Age of Onset     Melanoma Mother      Autism Spectrum Disorder Mother      Depression Mother      Anxiety Disorder Mother      Asthma Mother      Bipolar Disorder Mother      Chronic Obstructive Pulmonary Disease Mother      Bipolar Disorder Father      Cancer Father 52        nasopharyngeal     Diabetes Father      Hypertension Father      Hyperlipidemia Father      Depression Father      Anxiety  Disorder Father      Obesity Father      Pancreatic Cancer Father      Pancreatic Cancer Maternal Grandmother      Parkinsonism Maternal Grandmother      Thrombocytopenia Maternal Grandmother      Unknown/Adopted Maternal Grandfather      Glaucoma Paternal Grandmother      Pancreatic Cancer Paternal Grandmother         diagnosed in her 60s-70s     Melanoma Paternal Grandmother         diagnosed in her 60s-70s     Pancreatic Cancer Paternal Grandfather         possible diagnosis     Diabetes Paternal Grandfather      Hypertension Paternal Grandfather      Hyperlipidemia Paternal Grandfather      Cystic Fibrosis Daughter      Pancreatic Cancer Paternal Aunt         diagnosed in her 50s-60s     Cancer Paternal Uncle         nasopharyngeal cancer, diagnosed in his 50s-60s     Colon Cancer Cousin         paternal first-cousin, prior to age 50     Leukemia Cousin         paternal first-cousin, in childhood     Pancreatic Cancer Other      Stomach Cancer Other         great-aunt (paternal grandmother's sister)          Review of Systems - 10 point Review of Systems is negative except for fungal nails which is noted in HPI.    OBJECTIVE:  Appearance: alert, well appearing, and in no distress.    VITAL SIGNS: /72   Pulse 104   Temp 97.8  F (36.6  C)       General appearance: Patient is alert and fully cooperative with history & exam.  No sign of distress is noted during the visit.     Psychiatric: Affect is pleasant & appropriate.  Patient appears motivated to improve health.     Respiratory: Breathing is regular & unlabored while sitting.     HEENT: Hearing is intact to spoken word.  Speech is clear.  No gross evidence of visual impairment that would impact ambulation.      Vascular: Dorsalis pedis and posterior tibial pulses are palpable. There is pedal hair growth right.  CFT < 3 sec from anterior tibial surface to distal digits right. There is no appreciable edema noted.  Dermatologic: Dystrophic nails 1,4,5  right foot. No erythema right foot.  Neurologic: All epicritic and proprioceptive sensations are grossly intact right.  Musculoskeletal: Contracted digits right foot.

## 2021-11-24 ENCOUNTER — TELEPHONE (OUTPATIENT)
Dept: PODIATRY | Facility: CLINIC | Age: 41
End: 2021-11-24
Payer: COMMERCIAL

## 2021-11-24 ENCOUNTER — TELEPHONE (OUTPATIENT)
Dept: ONCOLOGY | Facility: CLINIC | Age: 41
End: 2021-11-24
Payer: COMMERCIAL

## 2021-11-24 NOTE — TELEPHONE ENCOUNTER
11/24/2021     Jack Logan did not answer his phone for his Cancer Risk Management Program Genetic Counseling appointment today. Multiple attempts were made to try to contact him during his appointment time without success. If he would like to reschedule, appointments can be made by calling [phone].     Michael Pineda MS [facility] [place]  Licensed Genetic Counselor  Phone: [phone]  Fax: [phone]

## 2021-11-29 ENCOUNTER — MYC MEDICAL ADVICE (OUTPATIENT)
Dept: FAMILY MEDICINE | Facility: CLINIC | Age: 41
End: 2021-11-29
Payer: COMMERCIAL

## 2021-11-29 DIAGNOSIS — R53.83 OTHER FATIGUE: Primary | ICD-10-CM

## 2021-11-29 DIAGNOSIS — Z11.59 ENCOUNTER FOR SCREENING FOR OTHER VIRAL DISEASES: ICD-10-CM

## 2021-11-29 NOTE — TELEPHONE ENCOUNTER
Called pt- sent email to [facility] waiting for confirmation from them on 12/9. Will call pt back once receive confirmation.

## 2021-11-30 ENCOUNTER — MYC MEDICAL ADVICE (OUTPATIENT)
Dept: FAMILY MEDICINE | Facility: CLINIC | Age: 41
End: 2021-11-30
Payer: COMMERCIAL

## 2021-11-30 NOTE — TELEPHONE ENCOUNTER
Pt scheduled by phone. No appt with Dr Mittal on the 14th available.   Pt doesn't know when his surgery is- he states it depends on when he can get in for his pre op.

## 2021-12-15 ENCOUNTER — OFFICE VISIT (OUTPATIENT)
Dept: FAMILY MEDICINE | Facility: CLINIC | Age: 41
End: 2021-12-15
Payer: COMMERCIAL

## 2021-12-15 VITALS
WEIGHT: 241 LBS | BODY MASS INDEX: 34.5 KG/M2 | HEART RATE: 98 BPM | OXYGEN SATURATION: 96 % | DIASTOLIC BLOOD PRESSURE: 74 MMHG | HEIGHT: 70 IN | SYSTOLIC BLOOD PRESSURE: 124 MMHG | TEMPERATURE: 98.5 F

## 2021-12-15 DIAGNOSIS — L60.0 ONYCHOCRYPTOSIS: ICD-10-CM

## 2021-12-15 DIAGNOSIS — G47.33 OSA (OBSTRUCTIVE SLEEP APNEA): ICD-10-CM

## 2021-12-15 DIAGNOSIS — R53.83 OTHER FATIGUE: ICD-10-CM

## 2021-12-15 DIAGNOSIS — Z01.818 PREOP GENERAL PHYSICAL EXAM: Primary | ICD-10-CM

## 2021-12-15 DIAGNOSIS — B35.1 ONYCHOMYCOSIS: ICD-10-CM

## 2021-12-15 PROCEDURE — 84270 ASSAY OF SEX HORMONE GLOBUL: CPT | Performed by: FAMILY MEDICINE

## 2021-12-15 PROCEDURE — 99214 OFFICE O/P EST MOD 30 MIN: CPT | Performed by: FAMILY MEDICINE

## 2021-12-15 PROCEDURE — 36415 COLL VENOUS BLD VENIPUNCTURE: CPT | Performed by: FAMILY MEDICINE

## 2021-12-15 PROCEDURE — 84403 ASSAY OF TOTAL TESTOSTERONE: CPT | Performed by: FAMILY MEDICINE

## 2021-12-15 PROCEDURE — 80053 COMPREHEN METABOLIC PANEL: CPT | Performed by: FAMILY MEDICINE

## 2021-12-15 ASSESSMENT — MIFFLIN-ST. JEOR: SCORE: 1986.48

## 2021-12-15 NOTE — PATIENT INSTRUCTIONS

## 2021-12-15 NOTE — PROGRESS NOTES
[facility]  [address]  [place] [address]  Phone: [phone]  Fax: [phone]  Primary Provider: Amina Mittal  Pre-op Performing Provider: LENA LYONS       PREOPERATIVE EVALUATION:  Today's date: 12/15/2021    Jack Logan is a 41 year old male who presents for a preoperative evaluation.    Surgical Information:  Surgery/Procedure: Matrixectomy digits one, four and five right foot  Surgery Location: [facility]  Surgeon: Dr. Merchant  Surgery Date: 12/20/21  Time of Surgery: 1 pm  Where patient plans to recover: At home with family  Fax number for surgical facility: Note does not need to be faxed, will be available electronically in Epic.    Type of Anesthesia Anticipated: Combined MAC with Local    Assessment & Plan     The proposed surgical procedure is considered LOW risk.     Jack was seen today for pre-op exam.    Diagnoses and all orders for this visit:    Preop general physical exam    Onychomycosis      Medication Instructions:  Patient is to take all scheduled medications on the day of surgery EXCEPT for modifications listed below:   - SSRIs, SNRIs, TCAs, Antipsychotics: Continue without modification.       RECOMMENDATION:  APPROVAL GIVEN to proceed with proposed procedure, without further diagnostic evaluation.    Subjective     HPI related to upcoming procedure: patient with history of onychomycosis without pain and itching.  Has tried and failed oral antifungal treatment.      Preop Questions 12/14/2021   1. Have you ever had a heart attack or stroke? No   2. Have you ever had surgery on your heart or blood vessels, such as a stent placement, a coronary artery bypass, or surgery on an artery in your head, neck, heart, or legs? No   3. Do you have chest pain with activity? No   4. Do you have a history of  heart failure? No   5. Do you currently have a cold, bronchitis or symptoms of other  infection? No   6. Do you have a cough, shortness of breath, or wheezing? No   7. Do you or anyone in your family have previous history of blood clots? No   8. Do you or does anyone in your family have a serious bleeding problem such as prolonged bleeding following surgeries or cuts? No   9. Have you ever had problems with anemia or been told to take iron pills? No   10. Have you had any abnormal blood loss such as black, tarry or bloody stools? No   11. Have you ever had a blood transfusion? No   12. Are you willing to have a blood transfusion if it is medically needed before, during, or after your surgery? Yes   13. Have you or any of your relatives ever had problems with anesthesia? No   14. Do you have sleep apnea, excessive snoring or daytime drowsiness? YES - history of ADAM.    14a. Do you have a CPAP machine? Yes   15. Do you have any artifical heart valves or other implanted medical devices like a pacemaker, defibrillator, or continuous glucose monitor? No   16. Do you have artificial joints? No   17. Are you allergic to latex? No       Health Care Directive:  Patient does not have a Health Care Directive or Living Will: Patient states has Advance Directive and will bring in a copy to clinic.    Preoperative Review of :   reviewed - no record of controlled substances prescribed.      Status of Chronic Conditions:  See problem list for active medical problems.  Problems all longstanding and stable, except as noted/documented.  See ROS for pertinent symptoms related to these conditions.      Review of Systems  CONSTITUTIONAL: NEGATIVE for fever, chills, change in weight  INTEGUMENTARY/SKIN: NEGATIVE for worrisome rashes, moles or lesions  EYES: NEGATIVE for vision changes or irritation  ENT/MOUTH: NEGATIVE for ear, mouth and throat problems  RESP: NEGATIVE for significant cough or SOB  CV: NEGATIVE for chest pain, palpitations or peripheral edema  GI: NEGATIVE for nausea, abdominal pain, heartburn, or  change in bowel habits  : NEGATIVE for frequency, dysuria, or hematuria  MUSCULOSKELETAL: NEGATIVE for significant arthralgias or myalgia  NEURO: NEGATIVE for weakness, dizziness or paresthesias  ENDOCRINE: NEGATIVE for temperature intolerance, skin/hair changes  HEME: NEGATIVE for bleeding problems  PSYCHIATRIC: NEGATIVE for changes in mood or affect    Patient Active Problem List    Diagnosis Date Noted     Onychomycosis 11/23/2021     Priority: Medium     Other bipolar disorder (H) 10/04/2021     Priority: Medium     Mood disorder (H) 10/04/2021     Priority: Medium     Obstructive sleep apnea of adult 10/04/2021     Priority: Medium     Erectile dysfunction 10/04/2021     Priority: Medium     Attention deficit hyperactivity disorder (ADHD), predominantly inattentive type 10/04/2021     Priority: Medium     Asperger's disorder 09/29/2021     Priority: Medium     Asthma      Priority: Medium     Created by Conversion  [employer] [facility] Annotation: Oct  5 2011 11:Bret Wallace: R/t   allergies.      Formatting of this note might be different from the original.  Created by [employer] [facility] Annotation: Oct  5 2011 11:Bret Wallace: R/t allergies.       Sleep apnea 05/14/2018     Priority: Medium     Alcoholic fatty liver 10/30/2017     Priority: Medium     Gallbladder sludge 07/28/2017     Priority: Medium     Hyperlipidemia      Priority: Medium     Created by Conversion      Formatting of this note might be different from the original.  Created by Conversion       Allergic rhinitis      Priority: Medium     Created by Conversion  [employer] Utility updated for latest IMO load      Formatting of this note might be different from the original.  Created by Conversion    Replacement Utility updated for latest IMO load       Anxiety disorder      Priority: Medium     Created by Conversion  Replacement Utility updated for latest IMO load      Formatting of this note might be different from the  original.  Created by Conversion    Replacement Utility updated for latest IMO load       Hemorrhoids      Priority: Medium     Created by Conversion  Replacement Utility updated for latest IMO load      Formatting of this note might be different from the original.  Created by Conversion    Replacement Utility updated for latest IMO load       Bipolar Disorder      Priority: Medium     Created by Conversion  Replacement Utility updated for latest IMO load      Formatting of this note might be different from the original.  Created by Conversion    Replacement Utility updated for latest IMO load       Ingrown toenail 12/11/2015     Priority: Medium     Onychocryptosis      Priority: Medium     Candidal Intertrigo      Priority: Medium     Created by Conversion         Obesity      Priority: Medium     Created by Conversion      Formatting of this note might be different from the original.  Created by Conversion        Past Medical History:   Diagnosis Date     ADHD (attention deficit hyperactivity disorder)      ADHD (attention deficit hyperactivity disorder)      Allergic rhinitis, cause unspecified      Allergic rhinitis, cause unspecified      Anxiety      Anxiety      Asthma      Asthma      Cystic fibrosis carrier      Cystic fibrosis carrier      Depression      Depression      Depressive disorder     For as long as I can remember     Gastroesophageal reflux disease      Hearing loss      Hearing loss      Liver disease      Motion sickness      Obesity      Obesity      Onychomycosis      Onychomycosis      Sleep apnea      Past Surgical History:   Procedure Laterality Date     ABDOMEN SURGERY      Appendix     APPENDECTOMY       BIOPSY      Skin tissue for melenoma test     OTHER SURGICAL HISTORY      atypical nevus     WISDOM TOOTH EXTRACTION       Current Outpatient Medications   Medication Sig Dispense Refill     albuterol (PROAIR HFA;PROVENTIL HFA;VENTOLIN HFA) 90 mcg/actuation inhaler [ALBUTEROL (PROAIR  "HFA;PROVENTIL HFA;VENTOLIN HFA) 90 MCG/ACTUATION INHALER] Inhale 2 puffs every 4 (four) hours as needed for wheezing. 1 Inhaler 0     cetirizine (ZYRTEC) 10 MG tablet [CETIRIZINE (ZYRTEC) 10 MG TABLET] Take 10 mg by mouth daily.       desonide (DESOWEN) 0.05 % ointment [DESONIDE (DESOWEN) 0.05 % OINTMENT] Apply topically 2 (two) times a day.       dextroamphetamine-amphetamine (ADDERALL) 10 mg Tab tablet [DEXTROAMPHETAMINE-AMPHETAMINE (ADDERALL) 10 MG TAB TABLET] Take 10 mg by mouth 3 (three) times a day.       fluticasone (FLONASE) 50 mcg/actuation nasal spray [FLUTICASONE (FLONASE) 50 MCG/ACTUATION NASAL SPRAY] 1 spray into each nostril daily.       ketoconazole (NIZORAL) 2 % cream [KETOCONAZOLE (NIZORAL) 2 % CREAM] Apply topically daily.       LORazepam (ATIVAN) 0.5 MG tablet [LORAZEPAM (ATIVAN) 0.5 MG TABLET] Take 1 mg by mouth 3 (three) times a day.       loxapine (LOXITANE) 10 MG capsule TAKE ONE CAPSULE BY MOUTH AT BEDTIME FOR CLEAR THINKING AND MOOD       risperiDONE (RISPERDAL) 1 MG tablet        traZODone (DESYREL) 50 MG tablet [TRAZODONE (DESYREL) 50 MG TABLET] Take 50 mg by mouth bedtime.       venlafaxine (EFFEXOR) 75 MG tablet [VENLAFAXINE (EFFEXOR) 75 MG TABLET] Take 75 mg by mouth 2 (two) times a day.         Allergies   Allergen Reactions     Cats Hives and Shortness Of Breath     Dust Mite Extract Cough and Itching     Pollen Extract Rash        Social History     Tobacco Use     Smoking status: Never Smoker     Smokeless tobacco: Never Used   Substance Use Topics     Alcohol use: Yes     Alcohol/week: 2.5 standard drinks     Comment: Alcoholic Drinks/day: Social        History   Drug Use No         Objective     /74 (BP Location: Left arm, Patient Position: Sitting)   Pulse 98   Temp 98.5  F (36.9  C)   Ht 1.765 m (5' 9.5\")   Wt 109.3 kg (241 lb)   SpO2 96%   BMI 35.08 kg/m      Physical Exam    GENERAL APPEARANCE: healthy, alert and no distress     EYES: EOMI,  PERRL     HENT: ear " canals and TM's normal and nose and mouth without ulcers or lesions     NECK: no adenopathy, no asymmetry, masses, or scars and thyroid normal to palpation     RESP: lungs clear to auscultation - no rales, rhonchi or wheezes     CV: regular rates and rhythm, normal S1 S2, no S3 or S4 and no murmur, click or rub     ABDOMEN:  soft, nontender, no HSM or masses and bowel sounds normal     MS: extremities normal- no gross deformities noted, no evidence of inflammation in joints, FROM in all extremities.     SKIN: no suspicious lesions or rashes     NEURO: Normal strength and tone, sensory exam grossly normal, mentation intact and speech normal     PSYCH: mentation appears normal. and affect normal/bright     LYMPHATICS: No cervical adenopathy    Recent Labs   Lab Test 09/29/21  1027   HGB 14.5         POTASSIUM 5.8*   CR 1.03   A1C 5.5        Diagnostics:  Component      Latest Ref Rng & Units 12/15/2021 12/17/2021   Sodium      136 - 145 mmol/L 140    Potassium      3.5 - 5.0 mmol/L 4.1    Chloride      98 - 107 mmol/L 104    Carbon Dioxide      22 - 31 mmol/L 23    Anion Gap      5 - 18 mmol/L 13    Urea Nitrogen      8 - 22 mg/dL 14    Creatinine      0.70 - 1.30 mg/dL 0.88    Calcium      8.5 - 10.5 mg/dL 9.9    Glucose      70 - 125 mg/dL 95    Alkaline Phosphatase      45 - 120 U/L 64    AST      0 - 40 U/L 32    ALT      0 - 45 U/L 60 (H)    Protein Total      6.0 - 8.0 g/dL 8.2 (H)    Albumin      3.5 - 5.0 g/dL 4.3    Bilirubin Total      0.0 - 1.0 mg/dL 0.3    GFR Estimate      >60 mL/min/1.73m2 >90    Free Testosterone Calculated      ng/dL 7.06    Testosterone Total      240 - 950 ng/dL 313    Sex Hormone Binding Globulin      11 - 80 nmol/L 26    SARS CoV2 PCR      Negative, Testing sent to reference lab. Results will be returned via unsolicited result  Negative     No EKG required, no history of coronary heart disease, significant arrhythmia, peripheral arterial disease or other structural  heart disease.    Revised Cardiac Risk Index (RCRI):  The patient has the following serious cardiovascular risks for perioperative complications:   - No serious cardiac risks = 0 points     RCRI Interpretation: 0 points: Class I (very low risk - 0.4% complication rate)           Signed Electronically by: Javed Peterson MD  Copy of this evaluation report is provided to requesting physician.

## 2021-12-16 LAB
ALBUMIN SERPL-MCNC: 4.3 G/DL (ref 3.5–5)
ALP SERPL-CCNC: 64 U/L (ref 45–120)
ALT SERPL W P-5'-P-CCNC: 60 U/L (ref 0–45)
ANION GAP SERPL CALCULATED.3IONS-SCNC: 13 MMOL/L (ref 5–18)
AST SERPL W P-5'-P-CCNC: 32 U/L (ref 0–40)
BILIRUB SERPL-MCNC: 0.3 MG/DL (ref 0–1)
BUN SERPL-MCNC: 14 MG/DL (ref 8–22)
CALCIUM SERPL-MCNC: 9.9 MG/DL (ref 8.5–10.5)
CHLORIDE BLD-SCNC: 104 MMOL/L (ref 98–107)
CO2 SERPL-SCNC: 23 MMOL/L (ref 22–31)
CREAT SERPL-MCNC: 0.88 MG/DL (ref 0.7–1.3)
GFR SERPL CREATININE-BSD FRML MDRD: >90 ML/MIN/1.73M2
GLUCOSE BLD-MCNC: 95 MG/DL (ref 70–125)
POTASSIUM BLD-SCNC: 4.1 MMOL/L (ref 3.5–5)
PROT SERPL-MCNC: 8.2 G/DL (ref 6–8)
SHBG SERPL-SCNC: 26 NMOL/L (ref 11–80)
SODIUM SERPL-SCNC: 140 MMOL/L (ref 136–145)

## 2021-12-17 ENCOUNTER — ANESTHESIA EVENT (OUTPATIENT)
Dept: SURGERY | Facility: AMBULATORY SURGERY CENTER | Age: 41
End: 2021-12-17
Payer: COMMERCIAL

## 2021-12-17 ENCOUNTER — LAB (OUTPATIENT)
Dept: LAB | Facility: CLINIC | Age: 41
End: 2021-12-17
Attending: PODIATRIST
Payer: COMMERCIAL

## 2021-12-17 DIAGNOSIS — Z11.59 ENCOUNTER FOR SCREENING FOR OTHER VIRAL DISEASES: ICD-10-CM

## 2021-12-17 PROCEDURE — U0005 INFEC AGEN DETEC AMPLI PROBE: HCPCS

## 2021-12-17 PROCEDURE — U0003 INFECTIOUS AGENT DETECTION BY NUCLEIC ACID (DNA OR RNA); SEVERE ACUTE RESPIRATORY SYNDROME CORONAVIRUS 2 (SARS-COV-2) (CORONAVIRUS DISEASE [COVID-19]), AMPLIFIED PROBE TECHNIQUE, MAKING USE OF HIGH THROUGHPUT TECHNOLOGIES AS DESCRIBED BY CMS-2020-01-R: HCPCS

## 2021-12-18 LAB — SARS-COV-2 RNA RESP QL NAA+PROBE: NEGATIVE

## 2021-12-19 LAB
TESTOST FREE SERPL-MCNC: 7.06 NG/DL
TESTOST SERPL-MCNC: 313 NG/DL (ref 240–950)

## 2021-12-20 ENCOUNTER — HOSPITAL ENCOUNTER (OUTPATIENT)
Facility: AMBULATORY SURGERY CENTER | Age: 41
End: 2021-12-20
Attending: PODIATRIST
Payer: COMMERCIAL

## 2021-12-20 ENCOUNTER — ANESTHESIA (OUTPATIENT)
Dept: SURGERY | Facility: AMBULATORY SURGERY CENTER | Age: 41
End: 2021-12-20
Payer: COMMERCIAL

## 2021-12-20 VITALS
BODY MASS INDEX: 34.5 KG/M2 | SYSTOLIC BLOOD PRESSURE: 122 MMHG | HEART RATE: 85 BPM | OXYGEN SATURATION: 97 % | HEIGHT: 70 IN | RESPIRATION RATE: 16 BRPM | WEIGHT: 241 LBS | DIASTOLIC BLOOD PRESSURE: 65 MMHG | TEMPERATURE: 97.1 F

## 2021-12-20 DIAGNOSIS — B35.1 ONYCHOMYCOSIS: ICD-10-CM

## 2021-12-20 PROCEDURE — 11750 EXCISION NAIL&NAIL MATRIX: CPT | Mod: 51 | Performed by: PODIATRIST

## 2021-12-20 RX ORDER — OXYCODONE HYDROCHLORIDE 5 MG/1
5 TABLET ORAL EVERY 4 HOURS PRN
Status: DISCONTINUED | OUTPATIENT
Start: 2021-12-20 | End: 2021-12-21 | Stop reason: HOSPADM

## 2021-12-20 RX ORDER — ONDANSETRON 4 MG/1
4 TABLET, ORALLY DISINTEGRATING ORAL EVERY 30 MIN PRN
Status: DISCONTINUED | OUTPATIENT
Start: 2021-12-20 | End: 2021-12-21 | Stop reason: HOSPADM

## 2021-12-20 RX ORDER — CEFAZOLIN SODIUM 2 G/100ML
2 INJECTION, SOLUTION INTRAVENOUS
Status: COMPLETED | OUTPATIENT
Start: 2021-12-20 | End: 2021-12-20

## 2021-12-20 RX ORDER — LIDOCAINE 40 MG/G
CREAM TOPICAL
Status: DISCONTINUED | OUTPATIENT
Start: 2021-12-20 | End: 2021-12-21 | Stop reason: HOSPADM

## 2021-12-20 RX ORDER — CEFAZOLIN SODIUM 2 G/100ML
2 INJECTION, SOLUTION INTRAVENOUS SEE ADMIN INSTRUCTIONS
Status: DISCONTINUED | OUTPATIENT
Start: 2021-12-20 | End: 2021-12-21 | Stop reason: HOSPADM

## 2021-12-20 RX ORDER — FENTANYL CITRATE 50 UG/ML
25 INJECTION, SOLUTION INTRAMUSCULAR; INTRAVENOUS EVERY 5 MIN PRN
Status: DISCONTINUED | OUTPATIENT
Start: 2021-12-20 | End: 2021-12-21 | Stop reason: HOSPADM

## 2021-12-20 RX ORDER — OXYCODONE HYDROCHLORIDE 5 MG/1
5-10 TABLET ORAL EVERY 6 HOURS PRN
Qty: 30 TABLET | Refills: 0 | Status: SHIPPED | OUTPATIENT
Start: 2021-12-20 | End: 2022-02-14

## 2021-12-20 RX ORDER — ACETAMINOPHEN 325 MG/1
975 TABLET ORAL ONCE
Status: COMPLETED | OUTPATIENT
Start: 2021-12-20 | End: 2021-12-20

## 2021-12-20 RX ORDER — SODIUM CHLORIDE, SODIUM LACTATE, POTASSIUM CHLORIDE, CALCIUM CHLORIDE 600; 310; 30; 20 MG/100ML; MG/100ML; MG/100ML; MG/100ML
INJECTION, SOLUTION INTRAVENOUS CONTINUOUS
Status: DISCONTINUED | OUTPATIENT
Start: 2021-12-20 | End: 2021-12-21 | Stop reason: HOSPADM

## 2021-12-20 RX ORDER — LABETALOL 20 MG/4 ML (5 MG/ML) INTRAVENOUS SYRINGE
5 EVERY 10 MIN PRN
Status: DISCONTINUED | OUTPATIENT
Start: 2021-12-20 | End: 2021-12-21 | Stop reason: HOSPADM

## 2021-12-20 RX ORDER — FENTANYL CITRATE 50 UG/ML
25 INJECTION, SOLUTION INTRAMUSCULAR; INTRAVENOUS
Status: DISCONTINUED | OUTPATIENT
Start: 2021-12-20 | End: 2021-12-21 | Stop reason: HOSPADM

## 2021-12-20 RX ORDER — LIDOCAINE HYDROCHLORIDE 20 MG/ML
INJECTION, SOLUTION INFILTRATION; PERINEURAL PRN
Status: DISCONTINUED | OUTPATIENT
Start: 2021-12-20 | End: 2021-12-20 | Stop reason: HOSPADM

## 2021-12-20 RX ORDER — PROPOFOL 10 MG/ML
INJECTION, EMULSION INTRAVENOUS CONTINUOUS PRN
Status: DISCONTINUED | OUTPATIENT
Start: 2021-12-20 | End: 2021-12-20

## 2021-12-20 RX ORDER — SILVER SULFADIAZINE 10 MG/G
CREAM TOPICAL PRN
Status: DISCONTINUED | OUTPATIENT
Start: 2021-12-20 | End: 2021-12-20 | Stop reason: HOSPADM

## 2021-12-20 RX ORDER — HYDROMORPHONE HCL IN WATER/PF 6 MG/30 ML
0.2 PATIENT CONTROLLED ANALGESIA SYRINGE INTRAVENOUS EVERY 5 MIN PRN
Status: DISCONTINUED | OUTPATIENT
Start: 2021-12-20 | End: 2021-12-21 | Stop reason: HOSPADM

## 2021-12-20 RX ORDER — ONDANSETRON 2 MG/ML
4 INJECTION INTRAMUSCULAR; INTRAVENOUS EVERY 30 MIN PRN
Status: DISCONTINUED | OUTPATIENT
Start: 2021-12-20 | End: 2021-12-21 | Stop reason: HOSPADM

## 2021-12-20 RX ORDER — PROPOFOL 10 MG/ML
INJECTION, EMULSION INTRAVENOUS PRN
Status: DISCONTINUED | OUTPATIENT
Start: 2021-12-20 | End: 2021-12-20

## 2021-12-20 RX ORDER — DEXAMETHASONE SODIUM PHOSPHATE 4 MG/ML
INJECTION, SOLUTION INTRA-ARTICULAR; INTRALESIONAL; INTRAMUSCULAR; INTRAVENOUS; SOFT TISSUE PRN
Status: DISCONTINUED | OUTPATIENT
Start: 2021-12-20 | End: 2021-12-20

## 2021-12-20 RX ORDER — FENTANYL CITRATE 50 UG/ML
INJECTION, SOLUTION INTRAMUSCULAR; INTRAVENOUS PRN
Status: DISCONTINUED | OUTPATIENT
Start: 2021-12-20 | End: 2021-12-20

## 2021-12-20 RX ORDER — ONDANSETRON 2 MG/ML
INJECTION INTRAMUSCULAR; INTRAVENOUS PRN
Status: DISCONTINUED | OUTPATIENT
Start: 2021-12-20 | End: 2021-12-20

## 2021-12-20 RX ADMIN — PROPOFOL 50 MG: 10 INJECTION, EMULSION INTRAVENOUS at 14:25

## 2021-12-20 RX ADMIN — DEXAMETHASONE SODIUM PHOSPHATE 4 MG: 4 INJECTION, SOLUTION INTRA-ARTICULAR; INTRALESIONAL; INTRAMUSCULAR; INTRAVENOUS; SOFT TISSUE at 14:43

## 2021-12-20 RX ADMIN — PROPOFOL 100 MCG/KG/MIN: 10 INJECTION, EMULSION INTRAVENOUS at 14:25

## 2021-12-20 RX ADMIN — FENTANYL CITRATE 50 MCG: 50 INJECTION, SOLUTION INTRAMUSCULAR; INTRAVENOUS at 14:28

## 2021-12-20 RX ADMIN — ACETAMINOPHEN 975 MG: 325 TABLET ORAL at 12:37

## 2021-12-20 RX ADMIN — ONDANSETRON 4 MG: 2 INJECTION INTRAMUSCULAR; INTRAVENOUS at 14:43

## 2021-12-20 RX ADMIN — FENTANYL CITRATE 50 MCG: 50 INJECTION, SOLUTION INTRAMUSCULAR; INTRAVENOUS at 14:32

## 2021-12-20 RX ADMIN — CEFAZOLIN SODIUM 2 G: 2 INJECTION, SOLUTION INTRAVENOUS at 14:25

## 2021-12-20 RX ADMIN — SODIUM CHLORIDE, SODIUM LACTATE, POTASSIUM CHLORIDE, CALCIUM CHLORIDE: 600; 310; 30; 20 INJECTION, SOLUTION INTRAVENOUS at 13:02

## 2021-12-20 ASSESSMENT — MIFFLIN-ST. JEOR: SCORE: 1986.48

## 2021-12-20 NOTE — ANESTHESIA PREPROCEDURE EVALUATION
Anesthesia Pre-Procedure Evaluation    Patient: Jack Logan   MRN: [id number] : 1980        Preoperative Diagnosis: Onychomycosis [B35.1]    Procedure : Procedure(s):  Matrixectomy digits one, four and five right foot          Past Medical History:   Diagnosis Date     ADHD (attention deficit hyperactivity disorder)      ADHD (attention deficit hyperactivity disorder)      Allergic rhinitis, cause unspecified      Allergic rhinitis, cause unspecified      Anxiety      Anxiety      Asthma      Asthma      Cystic fibrosis carrier      Cystic fibrosis carrier      Depression      Depression      Depressive disorder     For as long as I can remember     Gastroesophageal reflux disease      Hearing loss      Hearing loss      Liver disease      Motion sickness      Obesity      Obesity      Onychomycosis      Onychomycosis      Sleep apnea       Past Surgical History:   Procedure Laterality Date     ABDOMEN SURGERY      Appendix     APPENDECTOMY       BIOPSY      Skin tissue for melenoma test     OTHER SURGICAL HISTORY      atypical nevus     WISDOM TOOTH EXTRACTION        Allergies   Allergen Reactions     Cats Hives and Shortness Of Breath     Dust Mite Extract Cough and Itching     Pollen Extract Rash      Social History     Tobacco Use     Smoking status: Never Smoker     Smokeless tobacco: Never Used   Substance Use Topics     Alcohol use: Yes     Alcohol/week: 2.5 standard drinks     Comment: Alcoholic Drinks/day: Social       Wt Readings from Last 1 Encounters:   12/15/21 109.3 kg (241 lb)        Anesthesia Evaluation   Pt has had prior anesthetic.     History of anesthetic complications: requres a lot per patient.       ROS/MED HX  ENT/Pulmonary:     (+) sleep apnea, uses CPAP, Intermittent, asthma     Neurologic: Comment: autism      Cardiovascular:       METS/Exercise Tolerance: >4 METS    Hematologic:  - neg hematologic  ROS     Musculoskeletal:  - neg musculoskeletal ROS     GI/Hepatic:     (+)  GERD, Asymptomatic on medication, liver disease,     Renal/Genitourinary:       Endo:     (+) Obesity,     Psychiatric/Substance Use:     (+) psychiatric history anxiety, depression and bipolar     Infectious Disease:       Malignancy:       Other:  - neg other ROS          Physical Exam    Airway        Mallampati: III   TM distance: > 3 FB   Neck ROM: full     Respiratory Devices and Support         Dental       (+) chipped      Cardiovascular          Rhythm and rate: regular     Pulmonary           breath sounds clear to auscultation           OUTSIDE LABS:  CBC:   Lab Results   Component Value Date    WBC 7.4 09/29/2021    WBC 7.2 07/29/2019    HGB 14.5 09/29/2021    HGB 15.0 07/29/2019    HCT 44.7 09/29/2021    HCT 44.5 07/29/2019     09/29/2021     07/29/2019     BMP:   Lab Results   Component Value Date     12/15/2021     09/29/2021    POTASSIUM 4.1 12/15/2021    POTASSIUM 5.8 (H) 09/29/2021    CHLORIDE 104 12/15/2021    CHLORIDE 108 (H) 09/29/2021    CO2 23 12/15/2021    CO2 25 09/29/2021    BUN 14 12/15/2021    BUN 14 09/29/2021    CR 0.88 12/15/2021    CR 1.03 09/29/2021    GLC 95 12/15/2021    GLC 93 09/29/2021     COAGS: No results found for: PTT, INR, FIBR  POC: No results found for: BGM, HCG, HCGS  HEPATIC:   Lab Results   Component Value Date    ALBUMIN 4.3 12/15/2021    PROTTOTAL 8.2 (H) 12/15/2021    ALT 60 (H) 12/15/2021    AST 32 12/15/2021    ALKPHOS 64 12/15/2021    BILITOTAL 0.3 12/15/2021     OTHER:   Lab Results   Component Value Date    LACT 1.4 04/26/2018    A1C 5.5 09/29/2021    ELIJAH 9.9 12/15/2021    CRP 9.3 (H) 04/26/2018       Anesthesia Plan    ASA Status:  3   NPO Status:  NPO Appropriate    Anesthesia Type: MAC.              Consents    Anesthesia Plan(s) and associated risks, benefits, and realistic alternatives discussed. Questions answered and patient/representative(s) expressed understanding.    - Discussed:     - Discussed with:  Patient          Postoperative Care    Pain management: Multi-modal analgesia.   PONV prophylaxis: Dexamethasone or Solumedrol, Ondansetron (or other 5HT-3)     Comments:    Other Comments:     Ketamine  Toradol if ok with surgeon  LMA prn            Lucina Leon MD

## 2021-12-20 NOTE — OP NOTE
"Date: 12/20/21    Surgeon: ASAD Merchant DPM    Preoperative diagnosis:   1. Onychomycosis right hallux  2. Onychomycosis 4th digit right foot  3. Onychomycosis 5th digit right foot     Postoperative diagnosis: Same    Procedure:   1. Permanent nail avulsion right hallux  2. Permanent nail avulsion 4th digit right foot  3. Permanent nail avulsion 5th digit right foot     Anesthesia: Local with IV-sedation    Hemostasis: Tournicot     Pathology: none    Injectables: none    Materials: none     Complications: none    Blood loss: 1 cc      Findings: Patient presents for operative intervention for permanent nail avulsions on digits 1,4,5 right foot. Patient was unable to tolerate this procedure in-office. We discussed today's procedure today including the post-op period. Patient questions invited and answered, including appropriate risk, benefits and complications. No guarantees given or implied. Patient has been NPO.    Description: Patient was brought to the operating room and placed on the table in supine position. IV-sedation was administered by the anesthesia department.  Injection of 0.5% Marcaine plain was administered to digits 1,4,5 right foot. The foot was then prepped and draped in usual aseptic manner. Following the application of a digital Tourni-cot the following procedures were performed.  Attention was directed to the right hallux toenail where utilizing an [place] elevator and an English anvil nail splitter the nail was split from distal to proximal to the level of the epionychium.  Next the total nail plate was removed.  3,30 second applications of phenol were applied to the matrix.  The wound was then flushed with copious amounts of alcohol. The Tourni-cot was removed. A dressing was applied comprising of silvadene 2x2 and 1\" coban wrap.  The tourniquet was removed and normal color returned.      Attention was directed to the 4th digit toenail where utilizing an [place] elevator and an English anvil nail " "splitter the nail was split from distal to proximal to the level of the epionychium.  Next the total nail plate was removed.  3,30 second applications of phenol were applied to the matrix.  The wound was then flushed with copious amounts of alcohol. The Tourni-cot was removed. A dressing was applied comprising of silvadine 2x2 and 1\" coban wrap.  The tourniquet was removed and normal color returned.      Attention was directed to the 5th digit toenail where utilizing an [place] elevator and an English anvil nail splitter the nail was split from distal to proximal to the level of the epionychium.  Next the total nail was removed.  3,30 second applications of phenol were applied to the matrix.  The wound was then flushed with copious amounts of alcohol. The Tourni-cot was removed. A dressing was applied comprising of bacitracin 2x2 and 1\" coban wrap.  The tourniquet was removed and normal color returned.      The patient appeared to tolerate all the procedures and anesthesia well without apparent complications. Patient was transported from the operating room to the recovery room with vital signs stable and neurovascular status as it was pre-operatively to the right foot. Patient to be discharged home per anesthesia. Written and verbal homecare instructions given. Patient to follow up in office for post-operative management in 1-2 weeks.     "

## 2021-12-20 NOTE — ANESTHESIA CARE TRANSFER NOTE
Patient: Jack Logan    Procedure: Procedure(s):  Matrixectomy digits one, four and five right foot       Diagnosis: Onychomycosis [B35.1]  Diagnosis Additional Information: No value filed.    Anesthesia Type:   MAC     Note:    Oropharynx: oropharynx clear of all foreign objects  Level of Consciousness: awake    Level of Supplemental Oxygen (L/min / FiO2): 8  Independent Airway: airway patency satisfactory and stable  Dentition: dentition unchanged  Vital Signs Stable: post-procedure vital signs reviewed and stable  Report to RN Given: handoff report given  Patient transferred to: Phase II    Handoff Report: Identifed the Patient, Identified the Reponsible Provider, Reviewed the pertinent medical history, Discussed the surgical course, Reviewed Intra-OP anesthesia mangement and issues during anesthesia, Set expectations for post-procedure period and Allowed opportunity for questions and acknowledgement of understanding      Vitals:  Vitals Value Taken Time   /65 12/20/21 1503   Temp 97.1  F (36.2  C) 12/20/21 1503   Pulse 85 12/20/21 1503   Resp 16 12/20/21 1503   SpO2 97 % 12/20/21 1503       Electronically Signed By: HEATH Gr CRNA  December 20, 2021  3:06 PM

## 2021-12-20 NOTE — ANESTHESIA POSTPROCEDURE EVALUATION
Patient: Jack Logan    Procedure: Procedure(s):  Matrixectomy digits one, four and five right foot       Diagnosis:Onychomycosis [B35.1]  Diagnosis Additional Information: No value filed.    Anesthesia Type:  MAC    Note:  Disposition: Outpatient   Postop Pain Control: Uneventful            Sign Out: Well controlled pain   PONV: No   Neuro/Psych: Uneventful            Sign Out: Acceptable/Baseline neuro status   Airway/Respiratory: Uneventful            Sign Out: Acceptable/Baseline resp. status   CV/Hemodynamics: Uneventful            Sign Out: Acceptable CV status; No obvious hypovolemia; No obvious fluid overload   Other NRE: NONE   DID A NON-ROUTINE EVENT OCCUR? No           Last vitals:  Vitals Value Taken Time   /64 12/20/21 1530   Temp 97.1  F (36.2  C) 12/20/21 1503   Pulse 93 12/20/21 1516   Resp 16 12/20/21 1503   SpO2 93 % 12/20/21 1516   Vitals shown include unvalidated device data.    Electronically Signed By: Vineet Dawkins MD  December 20, 2021  3:36 PM

## 2021-12-20 NOTE — DISCHARGE INSTRUCTIONS
If you have any questions or concerns regarding your procedure, please contact Dr. Merchant, his office number is [phone].    You received 975 mg of acetaminophen (Tylenol) at 12:37 PM. Do not exceed 4,000 mg of acetaminophen during a 24 hour period and keep in mind that acetaminophen can also be found in many over-the-counter cold medications as well as narcotics that may be given for pain.     Discharge Instructions: After Your Surgery    You ve just had surgery. During surgery, you were given medicine called anesthesia to keep you relaxed and free of pain. After surgery, you may have some pain or nausea. This is common. Here are some tips for feeling better and getting well after surgery.    Going home    Your healthcare provider will show you how to take care of yourself when you go home. He or she will also answer your questions. Have an adult family member or friend drive you home. For the first 24 hours after your surgery:      Don't drive or use heavy equipment.    Don't make important decisions or sign legal papers.    Don't drink alcohol.    Have someone stay with you for the next 24 hours. He or she can watch for problems and help keep you safe.  Be sure to go to all follow-up visits with your healthcare provider. And rest after your surgery for as long as your healthcare provider tells you to.    Coping with pain    *Pain after surgery in normal and expected*    If you have pain after surgery, pain medicine will help you feel better. Take it as told, before pain becomes severe. Also, ask your healthcare provider or pharmacist about other ways to control pain. This might be with heat, ice, or relaxation. And follow any other instructions your surgeon or nurse gives you.    Tips for taking pain medicine    To get the best relief possible, remember these points:      Pain medicines can upset your stomach. Taking them with a little food may help.    Most pain relievers taken by mouth need at least 20 to  30 minutes to start to work.    Don't wait till your pain becomes severe before you take your medicine. Try to time your medicine so that you can take it before starting an activity. This might be before you get dressed, go for a walk, or sit down for dinner.    Constipation is a common side effect of pain medicines. You can take medicines such as laxatives (Miralax) or stool softeners to help ease constipation. Drinking lots of fluids and eating foods such as fruits and vegetables that are high in fiber can also help.    Drinking alcohol and taking pain medicine can cause dizziness and slow your breathing. It can even be deadly. Don't drink alcohol while taking pain medicine.    Pain medicine can make you react more slowly to things. Don't drive or run machinery while taking pain medicine.  Your healthcare provider may tell you to take acetaminophen to help ease your pain. Ask him or her how much you are supposed to take each day. Acetaminophen or other pain relievers may interact with your prescription medicines or other over-the-counter (OTC) medicines. Some prescription medicines have acetaminophen and other ingredients. Using both prescription and OTC acetaminophen for pain can cause you to overdose. Read the labels on your OTC medicines with care. This will help you to clearly know the list of ingredients, how much to take, and any warnings. It may also help you not take too much acetaminophen. If you have questions or don't understand the information, ask your pharmacist or healthcare provider to explain it to you before you take the OTC medicine.    When to call your healthcare provider    Call your healthcare provider if:      Fever of 101.5 F (38.6 C) or higher    Bleeding from the that doesn't stop with moderate pressure    Persistent pain or a sudden increase in pain    Nausea with vomiting that does not ease after a few hours.    Abdominal pain or bloating    Fainting    You still have intolerable pain  an hour after taking medicine. The medicine may not be strong enough.     You feel too sleepy, dizzy, or groggy. The medicine may be too strong.     You have side effects such as nausea or vomiting, or skin changes such as rash, itching, or hives. Your healthcare provider may suggest other medicines to control side effects.  Rash, itching, or hives may mean you have an allergic reaction. Report this right away. If you have trouble breathing or facial swelling, call 911 right away.    Managing nausea    Some people have an upset stomach after surgery. This is often because of anesthesia, pain, or pain medicine, or the stress of surgery. These tips will help you handle nausea and eat healthy foods as you get better. If you were on a special food plan before surgery, ask your healthcare provider if you should follow it while you get better. These tips may help:      Don't push yourself to eat. Your body will tell you when to eat and how much.    Start off with clear liquids and soup. They are easier to digest.    Next try semi-solid foods, such as mashed potatoes, applesauce, and gelatin, as you feel ready.    Slowly move to solid foods. Don t eat fatty, rich, or spicy foods at first.    Don't force yourself to have 3 large meals a day. Instead eat smaller amounts more often.    Take pain medicines with a small amount of solid food, such as crackers or toast, to prevent nausea.    If you have obstructive sleep apnea    You were given anesthesia medicine during surgery to keep you comfortable and free of pain. After surgery, you may have more apnea spells because of this medicine and other medicines you were given. The spells may last longer than usual.   At home:      Keep using the continuous positive airway pressure (CPAP) device when you sleep. Unless your healthcare provider tells you not to, use it when you sleep, day or night. CPAP is a common device used to treat obstructive sleep apnea.    Talk with your  provider before taking any pain medicine, muscle relaxants, or sedatives. Your provider will tell you about the possible dangers of taking these medicines.

## 2021-12-28 ENCOUNTER — LAB (OUTPATIENT)
Dept: LAB | Facility: CLINIC | Age: 41
End: 2021-12-28
Payer: COMMERCIAL

## 2021-12-28 DIAGNOSIS — K76.0 FATTY LIVER: Primary | ICD-10-CM

## 2021-12-28 DIAGNOSIS — E55.9 VITAMIN D DEFICIENCY: ICD-10-CM

## 2021-12-28 DIAGNOSIS — R53.83 OTHER FATIGUE: ICD-10-CM

## 2021-12-28 DIAGNOSIS — K76.0 FATTY LIVER: ICD-10-CM

## 2021-12-28 LAB
ALBUMIN SERPL-MCNC: 4.1 G/DL (ref 3.5–5)
ALP SERPL-CCNC: 66 U/L (ref 45–120)
ALT SERPL W P-5'-P-CCNC: 56 U/L (ref 0–45)
ANION GAP SERPL CALCULATED.3IONS-SCNC: 12 MMOL/L (ref 5–18)
AST SERPL W P-5'-P-CCNC: 26 U/L (ref 0–40)
BILIRUB SERPL-MCNC: 0.3 MG/DL (ref 0–1)
BUN SERPL-MCNC: 12 MG/DL (ref 8–22)
CALCIUM SERPL-MCNC: 9.2 MG/DL (ref 8.5–10.5)
CHLORIDE BLD-SCNC: 104 MMOL/L (ref 98–107)
CO2 SERPL-SCNC: 21 MMOL/L (ref 22–31)
CREAT SERPL-MCNC: 0.81 MG/DL (ref 0.7–1.3)
GFR SERPL CREATININE-BSD FRML MDRD: >90 ML/MIN/1.73M2
GLUCOSE BLD-MCNC: 91 MG/DL (ref 70–125)
POTASSIUM BLD-SCNC: 4.7 MMOL/L (ref 3.5–5)
PROT SERPL-MCNC: 7.3 G/DL (ref 6–8)
SODIUM SERPL-SCNC: 137 MMOL/L (ref 136–145)

## 2021-12-28 PROCEDURE — 84270 ASSAY OF SEX HORMONE GLOBUL: CPT

## 2021-12-28 PROCEDURE — 82306 VITAMIN D 25 HYDROXY: CPT

## 2021-12-28 PROCEDURE — 84403 ASSAY OF TOTAL TESTOSTERONE: CPT

## 2021-12-28 PROCEDURE — 80053 COMPREHEN METABOLIC PANEL: CPT

## 2021-12-28 PROCEDURE — 36415 COLL VENOUS BLD VENIPUNCTURE: CPT

## 2021-12-29 LAB
DEPRECATED CALCIDIOL+CALCIFEROL SERPL-MC: 22 UG/L (ref 30–80)
SHBG SERPL-SCNC: 24 NMOL/L (ref 11–80)

## 2021-12-31 LAB
TESTOST FREE SERPL-MCNC: 6.31 NG/DL
TESTOST SERPL-MCNC: 272 NG/DL (ref 240–950)

## 2022-02-01 ENCOUNTER — TRANSFERRED RECORDS (OUTPATIENT)
Dept: HEALTH INFORMATION MANAGEMENT | Facility: CLINIC | Age: 42
End: 2022-02-01
Payer: COMMERCIAL

## 2022-02-01 LAB
CREATININE (EXTERNAL): 1 MG/DL (ref 0.7–1.2)
GLUCOSE (EXTERNAL): 95 MG/DL (ref 74–100)
POTASSIUM (EXTERNAL): 5.1 MMOL/L (ref 3.5–5.1)
TSH SERPL-ACNC: 1.94 UIU/ML (ref 0.35–4.94)

## 2022-02-14 ENCOUNTER — OFFICE VISIT (OUTPATIENT)
Dept: PODIATRY | Facility: CLINIC | Age: 42
End: 2022-02-14
Payer: COMMERCIAL

## 2022-02-14 VITALS — OXYGEN SATURATION: 99 % | HEART RATE: 90 BPM | BODY MASS INDEX: 33.34 KG/M2 | HEIGHT: 68 IN | WEIGHT: 220 LBS

## 2022-02-14 DIAGNOSIS — B35.1 ONYCHOMYCOSIS: Primary | ICD-10-CM

## 2022-02-14 PROCEDURE — 99213 OFFICE O/P EST LOW 20 MIN: CPT | Performed by: PODIATRIST

## 2022-02-14 RX ORDER — GABAPENTIN 100 MG/1
CAPSULE ORAL
COMMUNITY
Start: 2022-02-01 | End: 2022-07-05

## 2022-02-14 ASSESSMENT — MIFFLIN-ST. JEOR: SCORE: 1867.41

## 2022-02-14 ASSESSMENT — PAIN SCALES - GENERAL: PAINLEVEL: MILD PAIN (3)

## 2022-02-14 NOTE — LETTER
2/14/2022         RE: Jack Logan  [address] [place]  [facility] [address]        Dear Colleague,    Thank you for referring your patient, Jack Logan, to the [facility]. Please see a copy of my visit note below.        FOOT AND ANKLE SURGERY/PODIATRY PROGRESS NOTE        ASSESSMENT: Onychomycosis      TREATMENT:  -The nail beds on digits 1,4,5 on the right foot have intact nail beds, no signs of infection.     -I recommend he stop soaking at this time and continue to monitor for erythema which is not present on exam today.     -Patient's questions invited and answered. He was encouraged to call my office with any further questions or concerns.     Yung Merchant DPM  [facility] Podiatry/Foot & Ankle Surgery        HPI: Jack Logan was seen again today concerned about mild pain in digits 1,4,5 on the right foot. He underwent permanent nail avulsion on 12/20. He continues to soak and has noticed some bloody drainage.     Past Medical History:   Diagnosis Date     ADHD (attention deficit hyperactivity disorder)      ADHD (attention deficit hyperactivity disorder)      Allergic rhinitis, cause unspecified      Allergic rhinitis, cause unspecified      Anxiety      Anxiety      Asthma      Asthma      Cystic fibrosis carrier      Cystic fibrosis carrier      Depression      Depression      Depressive disorder     For as long as I can remember     Gastroesophageal reflux disease      Hearing loss      Hearing loss      Liver disease      Motion sickness      Obesity      Obesity      Onychomycosis      Onychomycosis      Sleep apnea        Past Surgical History:   Procedure Laterality Date     ABDOMEN SURGERY      Appendix     APPENDECTOMY       BIOPSY      Skin tissue for melenoma test     EXCISE TOENAIL(S) Right 12/20/2021    Procedure: Matrixectomy digits one, four and five right foot;  Surgeon: Yung Merchant DPM;  Location: [facility] OR     OTHER SURGICAL  HISTORY      atypical nevus     WISDOM TOOTH EXTRACTION         Allergies   Allergen Reactions     Cats Hives and Shortness Of Breath     Dust Mite Extract Cough and Itching     Pollen Extract Rash         Current Outpatient Medications:      albuterol (PROAIR HFA;PROVENTIL HFA;VENTOLIN HFA) 90 mcg/actuation inhaler, [ALBUTEROL (PROAIR HFA;PROVENTIL HFA;VENTOLIN HFA) 90 MCG/ACTUATION INHALER] Inhale 2 puffs every 4 (four) hours as needed for wheezing., Disp: 1 Inhaler, Rfl: 0     cetirizine (ZYRTEC) 10 MG tablet, [CETIRIZINE (ZYRTEC) 10 MG TABLET] Take 10 mg by mouth daily., Disp: , Rfl:      desonide (DESOWEN) 0.05 % ointment, [DESONIDE (DESOWEN) 0.05 % OINTMENT] Apply topically 2 (two) times a day., Disp: , Rfl:      fluticasone (FLONASE) 50 mcg/actuation nasal spray, [FLUTICASONE (FLONASE) 50 MCG/ACTUATION NASAL SPRAY] 1 spray into each nostril daily., Disp: , Rfl:      gabapentin (NEURONTIN) 100 MG capsule, TAKE ONE CAPSULE BY MOUTH TWICE A DAY AND TAKE ONE CAPSULEDAILY AS NEEDED FOR ANXIETY, Disp: , Rfl:      ketoconazole (NIZORAL) 2 % cream, [KETOCONAZOLE (NIZORAL) 2 % CREAM] Apply topically daily., Disp: , Rfl:      loxapine (LOXITANE) 10 MG capsule, TAKE ONE CAPSULE BY MOUTH AT BEDTIME FOR CLEAR THINKING AND MOOD, Disp: , Rfl:      traZODone (DESYREL) 50 MG tablet, [TRAZODONE (DESYREL) 50 MG TABLET] Take 50 mg by mouth bedtime., Disp: , Rfl:      venlafaxine (EFFEXOR) 75 MG tablet, [VENLAFAXINE (EFFEXOR) 75 MG TABLET] Take 75 mg by mouth 2 (two) times a day., Disp: , Rfl:     Family History   Problem Relation Age of Onset     Melanoma Mother      Autism Spectrum Disorder Mother      Depression Mother      Anxiety Disorder Mother      Asthma Mother      Bipolar Disorder Mother      Chronic Obstructive Pulmonary Disease Mother      Bipolar Disorder Father      Cancer Father 52        nasopharyngeal     Diabetes Father      Hypertension Father      Hyperlipidemia Father      Depression Father      Anxiety  Disorder Father      Obesity Father      Pancreatic Cancer Father      Pancreatic Cancer Maternal Grandmother      Parkinsonism Maternal Grandmother      Thrombocytopenia Maternal Grandmother      Unknown/Adopted Maternal Grandfather      Glaucoma Paternal Grandmother      Pancreatic Cancer Paternal Grandmother         diagnosed in her 60s-70s     Melanoma Paternal Grandmother         diagnosed in her 60s-70s     Pancreatic Cancer Paternal Grandfather         possible diagnosis     Diabetes Paternal Grandfather      Hypertension Paternal Grandfather      Hyperlipidemia Paternal Grandfather      Cystic Fibrosis Daughter      Pancreatic Cancer Paternal Aunt         diagnosed in her 50s-60s     Cancer Paternal Uncle         nasopharyngeal cancer, diagnosed in his 50s-60s     Colon Cancer Cousin         paternal first-cousin, prior to age 50     Leukemia Cousin         paternal first-cousin, in childhood     Pancreatic Cancer Other      Stomach Cancer Other         great-aunt (paternal grandmother's sister)       Social History     Socioeconomic History     Marital status:      Spouse name: Not on file     Number of children: Not on file     Years of education: Not on file     Highest education level: Not on file   Occupational History     Not on file   Tobacco Use     Smoking status: Never Smoker     Smokeless tobacco: Never Used   Substance and Sexual Activity     Alcohol use: Yes     Alcohol/week: 2.5 standard drinks     Comment: Alcoholic Drinks/day: Social      Drug use: No     Sexual activity: Yes     Partners: Female   Other Topics Concern     Parent/sibling w/ CABG, MI or angioplasty before 65F 55M? No   Social History Narrative     Not on file     Social Determinants of Health     Financial Resource Strain: Not on file   Food Insecurity: Not on file   Transportation Needs: Not on file   Physical Activity: Not on file   Stress: Not on file   Social Connections: Not on file   Intimate Partner Violence: Not  "on file   Housing Stability: Not on file       10 point Review of Systems is negative except for nail avulsion which is noted in HPI.     Pulse 90   Ht 1.727 m (5' 8\")   Wt 99.8 kg (220 lb)   SpO2 99%   BMI 33.45 kg/m      BMI= Body mass index is 33.45 kg/m .    OBJECTIVE:  General appearance: Patient is alert and fully cooperative with history & exam.  No sign of distress is noted during the visit.    Vascular: Dorsalis pedis and posterior tibial pulses are palpable. There is pedal hair growth right.  CFT < 3 sec from anterior tibial surface to distal digits right. There is no appreciable edema noted.  Dermatologic: Nail bed intact digits 1,4,5 right foot. No erythema right foot.   Neurologic: All epicritic and proprioceptive sensations are grossly intact right.  Musculoskeletal: No pain digits 1,4,5 right foot.         Again, thank you for allowing me to participate in the care of your patient.        Sincerely,        Yung Merchant DPM    "

## 2022-02-14 NOTE — PROGRESS NOTES
FOOT AND ANKLE SURGERY/PODIATRY PROGRESS NOTE        ASSESSMENT: Onychomycosis      TREATMENT:  -The nail beds on digits 1,4,5 on the right foot have intact nail beds, no signs of infection.     -I recommend he stop soaking at this time and continue to monitor for erythema which is not present on exam today.     -Patient's questions invited and answered. He was encouraged to call my office with any further questions or concerns.     Yung Merchant DPM  [facility] Podiatry/Foot & Ankle Surgery        HPI: Jack Logan was seen again today concerned about mild pain in digits 1,4,5 on the right foot. He underwent permanent nail avulsion on 12/20. He continues to soak and has noticed some bloody drainage.     Past Medical History:   Diagnosis Date     ADHD (attention deficit hyperactivity disorder)      ADHD (attention deficit hyperactivity disorder)      Allergic rhinitis, cause unspecified      Allergic rhinitis, cause unspecified      Anxiety      Anxiety      Asthma      Asthma      Cystic fibrosis carrier      Cystic fibrosis carrier      Depression      Depression      Depressive disorder     For as long as I can remember     Gastroesophageal reflux disease      Hearing loss      Hearing loss      Liver disease      Motion sickness      Obesity      Obesity      Onychomycosis      Onychomycosis      Sleep apnea        Past Surgical History:   Procedure Laterality Date     ABDOMEN SURGERY      Appendix     APPENDECTOMY       BIOPSY      Skin tissue for melenoma test     EXCISE TOENAIL(S) Right 12/20/2021    Procedure: Matrixectomy digits one, four and five right foot;  Surgeon: Yung Merchant DPM;  Location: [place] [facility]     OTHER SURGICAL HISTORY      atypical nevus     WISDOM TOOTH EXTRACTION         Allergies   Allergen Reactions     Cats Hives and Shortness Of Breath     Dust Mite Extract Cough and Itching     Pollen Extract Rash         Current Outpatient Medications:      albuterol  (PROAIR HFA;PROVENTIL HFA;VENTOLIN HFA) 90 mcg/actuation inhaler, [ALBUTEROL (PROAIR HFA;PROVENTIL HFA;VENTOLIN HFA) 90 MCG/ACTUATION INHALER] Inhale 2 puffs every 4 (four) hours as needed for wheezing., Disp: 1 Inhaler, Rfl: 0     cetirizine (ZYRTEC) 10 MG tablet, [CETIRIZINE (ZYRTEC) 10 MG TABLET] Take 10 mg by mouth daily., Disp: , Rfl:      desonide (DESOWEN) 0.05 % ointment, [DESONIDE (DESOWEN) 0.05 % OINTMENT] Apply topically 2 (two) times a day., Disp: , Rfl:      fluticasone (FLONASE) 50 mcg/actuation nasal spray, [FLUTICASONE (FLONASE) 50 MCG/ACTUATION NASAL SPRAY] 1 spray into each nostril daily., Disp: , Rfl:      gabapentin (NEURONTIN) 100 MG capsule, TAKE ONE CAPSULE BY MOUTH TWICE A DAY AND TAKE ONE CAPSULEDAILY AS NEEDED FOR ANXIETY, Disp: , Rfl:      ketoconazole (NIZORAL) 2 % cream, [KETOCONAZOLE (NIZORAL) 2 % CREAM] Apply topically daily., Disp: , Rfl:      loxapine (LOXITANE) 10 MG capsule, TAKE ONE CAPSULE BY MOUTH AT BEDTIME FOR CLEAR THINKING AND MOOD, Disp: , Rfl:      traZODone (DESYREL) 50 MG tablet, [TRAZODONE (DESYREL) 50 MG TABLET] Take 50 mg by mouth bedtime., Disp: , Rfl:      venlafaxine (EFFEXOR) 75 MG tablet, [VENLAFAXINE (EFFEXOR) 75 MG TABLET] Take 75 mg by mouth 2 (two) times a day., Disp: , Rfl:     Family History   Problem Relation Age of Onset     Melanoma Mother      Autism Spectrum Disorder Mother      Depression Mother      Anxiety Disorder Mother      Asthma Mother      Bipolar Disorder Mother      Chronic Obstructive Pulmonary Disease Mother      Bipolar Disorder Father      Cancer Father 52        nasopharyngeal     Diabetes Father      Hypertension Father      Hyperlipidemia Father      Depression Father      Anxiety Disorder Father      Obesity Father      Pancreatic Cancer Father      Pancreatic Cancer Maternal Grandmother      Parkinsonism Maternal Grandmother      Thrombocytopenia Maternal Grandmother      Unknown/Adopted Maternal Grandfather      Glaucoma Paternal  "Grandmother      Pancreatic Cancer Paternal Grandmother         diagnosed in her 60s-70s     Melanoma Paternal Grandmother         diagnosed in her 60s-70s     Pancreatic Cancer Paternal Grandfather         possible diagnosis     Diabetes Paternal Grandfather      Hypertension Paternal Grandfather      Hyperlipidemia Paternal Grandfather      Cystic Fibrosis Daughter      Pancreatic Cancer Paternal Aunt         diagnosed in her 50s-60s     Cancer Paternal Uncle         nasopharyngeal cancer, diagnosed in his 50s-60s     Colon Cancer Cousin         paternal first-cousin, prior to age 50     Leukemia Cousin         paternal first-cousin, in childhood     Pancreatic Cancer Other      Stomach Cancer Other         great-aunt (paternal grandmother's sister)       Social History     Socioeconomic History     Marital status:      Spouse name: Not on file     Number of children: Not on file     Years of education: Not on file     Highest education level: Not on file   Occupational History     Not on file   Tobacco Use     Smoking status: Never Smoker     Smokeless tobacco: Never Used   Substance and Sexual Activity     Alcohol use: Yes     Alcohol/week: 2.5 standard drinks     Comment: Alcoholic Drinks/day: Social      Drug use: No     Sexual activity: Yes     Partners: Female   Other Topics Concern     Parent/sibling w/ CABG, MI or angioplasty before 65F 55M? No   Social History Narrative     Not on file     Social Determinants of Health     Financial Resource Strain: Not on file   Food Insecurity: Not on file   Transportation Needs: Not on file   Physical Activity: Not on file   Stress: Not on file   Social Connections: Not on file   Intimate Partner Violence: Not on file   Housing Stability: Not on file       10 point Review of Systems is negative except for nail avulsion which is noted in HPI.     Pulse 90   Ht 1.727 m (5' 8\")   Wt 99.8 kg (220 lb)   SpO2 99%   BMI 33.45 kg/m      BMI= Body mass index is 33.45 " kg/m .    OBJECTIVE:  General appearance: Patient is alert and fully cooperative with history & exam.  No sign of distress is noted during the visit.    Vascular: Dorsalis pedis and posterior tibial pulses are palpable. There is pedal hair growth right.  CFT < 3 sec from anterior tibial surface to distal digits right. There is no appreciable edema noted.  Dermatologic: Nail bed intact digits 1,4,5 right foot. No erythema right foot.   Neurologic: All epicritic and proprioceptive sensations are grossly intact right.  Musculoskeletal: No pain digits 1,4,5 right foot.

## 2022-02-15 ENCOUNTER — VIRTUAL VISIT (OUTPATIENT)
Dept: ONCOLOGY | Facility: CLINIC | Age: 42
End: 2022-02-15
Attending: FAMILY MEDICINE
Payer: COMMERCIAL

## 2022-02-15 DIAGNOSIS — Z80.52 FAMILY HISTORY OF BLADDER CANCER: ICD-10-CM

## 2022-02-15 DIAGNOSIS — Z83.79 FAMILY HISTORY OF PANCREATITIS: ICD-10-CM

## 2022-02-15 DIAGNOSIS — Z80.8: ICD-10-CM

## 2022-02-15 DIAGNOSIS — Z80.8 FAMILY HISTORY OF MALIGNANT MELANOMA: ICD-10-CM

## 2022-02-15 DIAGNOSIS — Z80.0 FAMILY HISTORY OF PANCREATIC CANCER: Primary | ICD-10-CM

## 2022-02-15 PROCEDURE — 96040 HC GENETIC COUNSELING, EACH 30 MINUTES: CPT | Mod: GT,95 | Performed by: GENETIC COUNSELOR, MS

## 2022-02-15 NOTE — LETTER
2/15/2022         RE: Jack Logan  [address]  [facility] [address]        Dear Colleague,    Thank you for referring your patient, Jack Logan, to the [facility]. Please see a copy of my visit note below.    2/15/2022    Catrachito is a 41 year old who is being evaluated via a billable video visit.      How would you like to obtain your AVS? MyChart  If the video visit is dropped, the invitation should be resent by: Send to e-mail at: [email or web address]  Will anyone else be joining your video visit? No    Referring Provider: Amina Mittal MD    Present for Today's Visit: Jack    Presenting Information:   I met with Jack Logan today for genetic counseling (video visit due to covid19) to discuss his family history of pancreatic cancer.  He is here today to review this history, cancer screening recommendations, and available genetic testing options.    Personal History:  Jack is a 41 year old male. He does not have any personal history of cancer. He does report a history of multiple precancerous skin lesions and follows with dermatology every 4-6 months. 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 He also reports a history of fatty liver disease. He shared that he has been diagnosed with autism.       He has not started regular colonoscopy screening given his age. He does not regularly do any other cancer screening at this time.  Jack reported no tobacco use, and about 1-2 drinks per week for alcohol use.    Family History: Cancer family history and pertinent information reviewed below. (Please see scanned pedigree for detailed family history information)    Daughter, age 14, has cystic fibrosis and has been diagnosed with autism.     Mother, age 67, has a history of bladder cancer diagnosed at age 67. She does have smoking history. She is s/p KYLEIGH-BSO in her 30's due to cysts. She has also been diagnosed with autism.     Maternal  grandmother had a history of pancreatic cancer diagnosed at an unknown age. She has also been diagnosed with autism.     Of note, Catrachito's mother was adopted, and he has no information about the health history of any other biological maternal relatives.     Father passed at age 53 with a history of nasopharyngeal cancer diagnosed at age 50 and subsequent pancreatic cancer diagnosed at age 53. Catrachito reported a history of pancreatitis for his father and notes smoking and alcohol use as significant exposures.     Paternal aunt passed at age 57 from pancreatic cancer diagnosed at age 57    Paternal uncle passed at age 60 from nasopharyngeal cancer. He was a smoker.     Paternal grandmother passed in her late 70's from pancreatic cancer diagnosed in his late 60's/early 70's.  She also had a history of melanoma diagnosed in her 50's.     Paternal grandfather passed in his late 80's/early 90's from pancreatic cancer.    His maternal ethnicity is [place]. His paternal ethnicity is Ashkenazi Hindu. There is no reported consanguinity.    Discussion:    Jack's family history of pancreatic cancer is suggestive of a hereditary cancer syndrome.    We reviewed the features of sporadic, familial, and hereditary cancers. In looking at Jack's family history, it is possible that a cancer susceptibility gene is present due to his father's pancreatic cancer history, his paternal aunt's pancreatic cancer history, and his paternal grandmother's pancreatic cancer and melanoma history as well as his paternal grandfather's pancreatic cancer history.    We discussed the natural history and genetics of hereditary cancers. A detailed handout regarding the information we discussed will be sent to Jack via [employer]. Topics included: inheritance pattern, cancer risks, cancer screening recommendations, and also risks, benefits and limitations of testing.    We discussed a few conditions that have been associated with increased risk for pancreatic  cancers.   We reviewed that the most common cause of hereditary breast cancer is Hereditary Breast and Ovarian Cancer (HBOC) syndrome, which is caused by mutations in the genes BRCA1 and BRCA2. Women with a mutation in either of these genes are at increased risk for breast and ovarian cancer. There is also an increased risk for a second primary breast cancer for women. Men with a mutation in either BRCA1 or BRCA2 are at increased risk for male breast and prostate cancer. Both women and men may also be at increased risk for pancreatic cancer and melanoma. We discussed that there are three mutations in the BRCA genes that are more common in the Ashkenazi Baptism population. About 1 in 40 individuals of Ashkenazi Baptism background have one of these three mutations, which account for about 90% of the BRCA mutations in this population. Catrachito shared that he has completed [facility] testing that did not identify any of the 3  mutations, although he understands that direct-to-consumer genetic testing is not comparable to clinical grade genetic testing.   Hernadez syndrome can be caused by a mutation in one of five genes:  MLH1, MSH2, MSH6, PMS2, and EPCAM.  Hernadez syndrome may present with polyps, but typically a small number.  The highest cancer risks associated with Hernadez syndrome include colon cancer, endometrial/uterine cancer, gastric cancer, and ovarian cancer as well as other gastrointestinal cancers such as pancreatic cancer.  Familial Atypical Multiple Mole Melanoma Syndrome (FAMMM) is caused by a single mutation in the CDKN2A gene.  The lifetime risk for melanoma is between 58-92% (PMID 44558970, PMID 89343351). Some studies indicate that these risks may vary, due to different factors. Individuals with FAMMM typically have many moles (more than 50), which can be atypical. People with FAMMM have increased risks for pancreatic cancer, and possibly other cancers. The exact risk of pancreatic cancer can depend on a  person s specific CDKN2A mutation, but has been reported as about 17% by age 75 by one larger study (PMID 61511642). We discussed screening for melanoma (frequent skin exams) and pancreatic cancer (endoscopic ultrasonography (EUS) and/or MRI/magnetic resonance).     Based on his personal and family history, Jack meets current National Comprehensive Cancer Network (NCCN) criteria for genetic testing of pancreatic cancer susceptibility genes.      We discussed that there are additional genes that could cause increased risk for pancreatic and related cancers. As many of these genes present with overlapping features in a family and accurate cancer risk cannot always be established based upon the pedigree analysis alone, it would be reasonable for Jack to consider panel genetic testing to analyze multiple genes at once.    We discussed genetic testing options for Jack given his personal and family history including a targeted panel of genes associated with increased risk for pancreatic cancers ([facility] Pancreatic Cancer panel) as well as expanded options including genes associated with common hereditary cancers ([facility] Common Hereditary Cancers panel; 47 genes) or genes associated with common and rare hereditary cancers ([facility] Multi-Cancer panel; 84 genes). We also discussed the option of adding on the genes associated with hereditary pancreatitis given his family history. After our discussion, Jack shared that he would like to learn as much information as possible from this testing. He opted to proceed with testing via the Multi-Cancer panel + Hereditary Pancreatitis genes through [facility].  The [facility] Multi-Cancer Panel analyzes 90 genes associated with an increased risk for cancer or chronic pancreatitis: AIP, ALK, APC, MARILEE, AXIN2, BAP1, BARD1, BLM, BMPR1A, BRCA1, BRCA2, BRIP1, CASR,CDC73, CDH1, CDK4, CDKN1B, CDKN1C, CDKN2A, CEBPA, CHEK2, CTNNA1, DICER1,DIS3L2, EGFR, EPCAM, FH, FLCN, GATA2, GPC3, GREM1, HOXB13,  HRAS, KIT, MAX,MEN1, MET, MITF, MLH1, MSH2, MSH3, MSH6, MUTYH, NBN, NF1, NF2, NTHL1,PALB2, PDGFRA, PHOX2B, PMS2, POLD1, POLE, POT1, VGTLV1S, PTCH1, PTEN, RAD50,RAD51C, RAD51D, RB1, RECQL4, RET, RUNX1, SDHA, SDHAF2, SDHB, SDHC, SDHD,SMAD4, SMARCA4, SMARCB1, SMARCE1, STK11, SUFU, TERC, TERT, CELY064, TP53,TSC1, TSC2, VHL, WRN, WT1 + pancreatitis genes (CASR, CFTR, CPA1, CTRC, PRSS1, SPINK1).  This panel includes genes associated with hereditary cancers across multiple major organ systems, including:  breast and gynecologic (breast, ovarian, uterine)  gastrointestinal (colorectal, gastric, pancreatic)  endocrine (thyroid, paraganglioma/pheochromocytoma, parathyroid, pituitary)  genitourinary (renal/urinary tract, prostate)  skin (melanoma, basal cell carcinoma)  brain/nervous system   sarcoma  hematologic (myelodysplastic syndrome/leukemia)  Individuals who are found to carry a pathogenic variant in one of these genes have an increased risk of developing certain cancers/tumors.  Identifying those at elevated risk may guide implementation of additional screening, surveillance and interventions.    Consent was obtained and genetic testing for the Multi-Cancers Panel [facility]. Turn around time: approximately 3-4 weeks.    Medical Management: For Jack, we reviewed that the information from genetic testing may determine:    additional cancer screening for which Jack may qualify (i.e. mammogram and breast MRI, more frequent colonoscopies, more frequent dermatologic exams, etc.),    and targeted chemotherapies for Jack if he were to develop certain cancers in the future (i.e. immunotherapy for individuals with Hernadez syndrome, PARP inhibitors, etc.).     These recommendations will be discussed in detail once genetic testing is completed.     Given the current COVID19 global pandemic, we discussed genetic testing options for Jack including having [facility] send a saliva kit to his home or coming into the clinic for  a blood draw. Jack opted to have a salivia kit sent to his home to complete the genetic testing. A kit was ordered from [facility] and a saliva collection kit will be sent to Jack's home address. I confirmed with Jack that the address we have on file is his current and correct address. We discussed some of the limitations of completing genetic testing via saliva and Jack was instructed to follow the instruction provided in the kit to ensure the best possibility of success for saliva genetic testing.     Plan:  1) Today Jack elected to proceed with Multi-Cancer panel + Hereditary pancreatitis panel genetic testing (90 genes) through [facility].  2) This information should be available in approximately 3 weeks from the time the lab receives his sample.  3) Jack will be contacted by our scheduling department to set up a result disclosure appointment.       Again, thank you for allowing me to participate in the care of your patient.      Sincerely,    Tiki Zhou GC

## 2022-02-16 NOTE — PATIENT INSTRUCTIONS
Assessing Cancer Risk  Only about 5-10% of cancers are thought to be due to an inherited cancer susceptibility gene. These families often have:    Several people with the same or related types of cancer    Cancers diagnosed at a young age (before age 50)    Individuals with more than one primary cancer    Multiple generations of the family affected with cancer    Some people may be candidates for genetic testing of more than one gene.  For these families, genetic testing using a cancer panel may be offered.  These panels can test many genes at once known to increase the risk for pancreatic (and other) cancers: APC, MARILEE, BRCA1, BRCA2, CDKN2A, EPCAM, MLH1, MSH2, MSH6, PALB2, PMS2, STK11, and TP53. The purpose of this handout is to serve as a brief summary of the pancreatic cancer risk genes and cancer syndrome with pancreatic cancer risks.     Hereditary Breast and Ovarian Cancer Syndrome  (BRCA1 and BRCA2)    A single mutation in one of the copies of BRCA1 or BRCA2 increases the risk for breast and ovarian cancer.  The risk for pancreatic cancer and melanoma may be slightly increased in some families. BRCA2 is considered the most common gene responsible for familial pancreatic cancer.    A person s ethnic background is also important to consider, as individuals of Ashkenazi [place] ancestry have a higher chance of having a BRCA gene mutation.  There are three BRCA mutations that occur more frequently in this population.    Hernadez Syndrome  (MLH1, MSH2, MSH6, PMS2, and EPCAM)    Currently five genes are known to cause Hernadez Syndrome: MLH1, MSH2, MSH6, PMS2, and EPCAM.  A single mutation in one of the Hernadez Syndrome genes increases the risk for colon, endometrial, ovarian, and stomach cancers.  Other cancers that occur less commonly in Hernadez Syndrome include urinary tract cancers, brain cancers, and other cancers.  People who have Hernadez Syndrome have up to a 6% risk of pancreatic cancer.     *Cancer risk varies  depending on Hernadez syndrome gene found    Familial Atypical Multiple Mole Melanoma Syndrome  (CDKN2A)    Familial Atypical Multiple Mole Melanoma Syndrome (FAMMM) is caused by a single mutation in the CDKN2A gene. This gene used to be called p16. The lifetime risk for melanoma is between 58-92%5. People with FAMMM have increased risks for pancreatic cancer, and possibly other cancers.      People with FAMMM typically have many moles (more than 50), which can be atypical.The exact risk of pancreatic cancer can depend on a person s specific CDKN2A mutation. The risk for pancreatic cancer be as high as 60% depending on the mutation.    Peutz-Jeghers Syndrome  (STK11)    Peutz-Jeghers Syndrome is caused by a mutation in the STK11 gene. The main features of Peutz-Jeghers Syndrome are multiple hamartomatous colon polyps and blue pigmentation of the lips and oral mucosa. Cancers associated with Peutz-Jeghers Syndrome include: gastrointestinal, gynecological, lung, breast, and pancreatic cancer. Up to a 36% lifetime risk of developing pancreatic cancer has been reported for those with Peutz-Jeghers syndrome.     Li-Fraumeni Syndrome  (TP53)    Li-Fraumeni Syndrome (LFS) is a cancer predisposition syndrome caused by a mutation in the TP53 gene. A single mutation in one of the copies of TP53 increases the risk for multiple cancers. Individuals with LFS are at an increased risk for developing cancer at a young age. The lifetime risk for development of a LFS-associated cancer is 50% by age 30 and 90% by age 60.      Core Cancers: Sarcomas, Breast, Brain, Lung, Leukemias/Lymphomas, Adrenocortical carcinomas    Other Cancers: Gastrointestinal (including pancreatic), Thyroid, Skin, Genitourinary    Familial Adenomatous Polyposis Syndrome  (APC)    Familial Adenomatous Polyposis syndrome (FAP) is caused by a single mutation in the APC gene and is characterize by having over 100 adenomatous polyps in the colon and significantly  increased risk for colon cancer. These typically appear during adolescence. FAP is associated with other tumors in the thyroid, stomach, and duodenum. People with FAP have an increased lifetime pancreatic cancer risk over the general population.    Additional Genes  PALB2  Mutations in the PALB2 gene have been shown to increase the risk of breast cancer up to 58% in some families. PALB2 mutations have also been associated with increased risk for pancreatic cancer.  Individuals who inherit two PALB2 mutations--one from their mother and one from their father--have a condition called Fanconi Anemia.  This condition is associated with short stature, developmental delay, bone marrow failure, and increased risk for childhood cancers.    MARILEE  Mutations in the MARILEE gene typically increase the risk of breast cancer 2-4 times higher than an average woman. MARILEE mutations have also been associated with an increased risk of pancreatic cancer. People who inherit an MARILEE mutation from both their mother and father have a condition called ataxia-telangiectasia. Ataxia telangiectasia is associated with ataxia in childhood (trouble with balance and walking), telangiectasias (red or purple spotty clusters on the skin), involuntary movements, frequent infections, and increased risk of leukemia and lymphoma.     Inheritance    All of the genes reviewed above are inherited in an autosomal dominant pattern.  This means that if a parent has a mutation, each of their children will have a 50% chance of inheriting that same mutation.  Every child--male or female--would have a 50% chance of inheriting the mutation and being at increased risk for developing cancer.       https://r.nlm.nih.gov/primer/inheritance/inheritancepatterns    Mutations in some genes can occur de ericka, which means that a person s mutation occurred for the first time in them and was not inherited from a parent.  Now that they have the mutation, however, it can be passed on  to future generations.     Genetic Testing  Genetic testing involves a blood test and will look for any harmful mutations that are associated with increased cancer risk.  If possible, it is recommended that the person(s) who has had cancer be tested before other family members.  That person will give us the most useful information about whether or not a specific gene is associated with the cancer in the family.    Advantages and Disadvantages   There are advantages and disadvantages to genetic testing.  Advantages    May clarify your cancer risk and additional appropriate cancer screenings     Can help you make medical decisions    May explain the cancers in your family    May give useful information to your family members (if you share your results)     Disadvantages    Possible negative emotional impact of learning about inherited cancer risk    Uncertainty in interpreting a negative test result in some situations    Possible genetic discrimination concerns (see below)     Genetic Information Nondiscrimination Act (JOSE ANTONIO)  JOSE ANTONIO is a federal law that protects individuals from health insurance or employment discrimination based on a genetic test result.  There are currently no legal discrimination protections in terms of life insurance, long term care, or disability insurances.  Visit the National Human Genome Research [place] website to learn more: https://www.genome.gov/26725681/genetic-discrimination/    Questions to Think About Regarding Genetic Testing:    What effect will the test result have on me and my relationship with my family members if I have an inherited gene mutation?  If I don t have a gene mutation?    Should I share my test results, and how will my family react to this news, which may also affect them?    Are my children ready to learn new information that may one day affect their own health?    There are three possible results of genetic testing:    Positive--a harmful mutation was identified in  one or more of the genes    Negative--no mutation was identified in any of the genes on this panel    Variant of unknown significance (VUS)--a variation in one of the genes was identified, but it is unclear how this impacts cancer risk in the family  o Families with VUS results can contact their genetic counselor annually for updates     Reducing Cancer Risk  Recommendations are based upon an individual s genetic test result as well as their personal and family history of cancer. Pancreatic cancer screening may be available based on family history. Talk with your physician about screening options.     Cancer Resources      National Pancreatic Cancer Foundation: npcf.     Pancreatic Cancer Action Network: pancan.org     FORCE: Facing Our Risk of Cancer Empowered: facingourrisk.org    [place]: PinnacleCarek.org    Li-Fraumeni Syndrome Association: lfsassociation.org    Collaborative Group of the Americas on Inherited Colorectal Cancer (CGA)   o cgaicc.com http://www.facingFision.org/    Cancer Care: cancercare.org    American Cancer Society (ACS): cancer.org    National Cancer [place] (NCI): cancer.gov      Please call us if you have any questions or concerns.   Cancer Risk Management Program [phone] ([phone])  ? Michael Pineda, MS, [facility] [phone]  ? Daylin Lopez, MS, [facility]  [phone]  ? Ladi Montero, MS, [facility]  [phone]  ? Jessie Dubois, MS, [facility] [phone]  ? Tiki Martínez, MS, [facility] [phone]   ? Sharmila Shah, MS, [facility]  [phone]  ? Shanika Weir, MS  [phone]    References  1. Genetic / Familial High-Risk Assessment?: Breast and Ovarian. NCCN Pract Guidel Oncol. 2017;1.2018.  2. Lanny J, Rajeev A, Kyara J, et al. The incidence of pancreatic cancer in BRCA1 and BRCA2 mutation carriers. Br J Cancer. 2012;107(12):2221-6952. doi:10.1038/bjc.2012.483.  3. Ahsan DB, Germania KG, Tomas S, et al. BRCA1, BRCA2, PALB2, and CDKN2A mutations in familial pancreatic cancer: a PACGENE study.  Radha Med. 2015;17(7):569-577. doi:10.1038/gim.2014.153.  4. Dayron G. Genetic / Familial High-Risk Assessment?: Colorectal. NCCN Pract Guidel Oncol. 2017;2.2017.  5. Alycia NATHAN,  M, Diallo E, Leonard NAPOLES. Familial Atypical Multiple Mole Melanoma Syndrome. National Center for [place] Information (); 2009. http://www.ncbi.nlm.nih.gov/pubmed/18823758. Accessed October 10, 2017.  6. Hernadez HT, Steve RM, Crow J, et al. Tumour spectrum in the FAMMM syndrome. Br J Cancer. 1981;44(4):553-560. http://www.ncbi.nlm.nih.gov/pubmed/5582018. Accessed October 10, 2017.  7. Diana F, Dea J, Natalia A, et al. Very high risk of cancer in familial Peutz-Jeghers syndrome. Gastroenterology. 2000;119(6):0610-2086. doi:10.1053/LEE.2000.39116.  8. Gidanial FM, Offerhaus GJ, Gaston DH, et al. Increased risk of thyroid and pancreatic carcinoma in familial adenomatous polyposis. Gut. 1993;34(10):4258-0240. doi:10.1136/GUT.34.10.1394.

## 2022-02-16 NOTE — PROGRESS NOTES
2/15/2022    Catrachito is a 41 year old who is being evaluated via a billable video visit.      How would you like to obtain your AVS? MyChart  If the video visit is dropped, the invitation should be resent by: Send to e-mail at: [email or web address]  Will anyone else be joining your video visit? No    Video-Visit Details  Type of service:  Video Visit  Video Start Time: 11:06am  Video End Time:11:53pm  Originating Location (pt. Location): Home  Distant Location (provider location):  [facility]   Platform used for Video Visit: [email or web address].com    Referring Provider: Amina Mittal MD    Present for Today's Visit: Jack    Presenting Information:   I met with Jack Logan today for genetic counseling (video visit due to covid19) to discuss his family history of pancreatic cancer.  He is here today to review this history, cancer screening recommendations, and available genetic testing options.    Personal History:  Jack is a 41 year old male. He does not have any personal history of cancer. He does report a history of multiple precancerous skin lesions and follows with dermatology every 4-6 months. 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 He also reports a history of fatty liver disease. He shared that he has been diagnosed with autism.       He has not started regular colonoscopy screening given his age. He does not regularly do any other cancer screening at this time.  Jack reported no tobacco use, and about 1-2 drinks per week for alcohol use.    Family History: Cancer family history and pertinent information reviewed below. (Please see scanned pedigree for detailed family history information)    Daughter, age 14, has cystic fibrosis and has been diagnosed with autism.     Mother, age 67, has a history of bladder cancer diagnosed at age 67. She does have smoking history. She is s/p KYLEIGH-BSO in her 30's due to cysts. She has also been diagnosed with autism.     Maternal  grandmother had a history of pancreatic cancer diagnosed at an unknown age. She has also been diagnosed with autism.     Of note, Catrachito's mother was adopted, and he has no information about the health history of any other biological maternal relatives.     Father passed at age 53 with a history of nasopharyngeal cancer diagnosed at age 50 and subsequent pancreatic cancer diagnosed at age 53. Catrachito reported a history of pancreatitis for his father and notes smoking and alcohol use as significant exposures.     Paternal aunt passed at age 57 from pancreatic cancer diagnosed at age 57    Paternal uncle passed at age 60 from nasopharyngeal cancer. He was a smoker.     Paternal grandmother passed in her late 70's from pancreatic cancer diagnosed in his late 60's/early 70's.  She also had a history of melanoma diagnosed in her 50's.     Paternal grandfather passed in his late 80's/early 90's from pancreatic cancer.    His maternal ethnicity is [place]. His paternal ethnicity is Ashkenazi Mu-ism. There is no reported consanguinity.    Discussion:    Jakc's family history of pancreatic cancer is suggestive of a hereditary cancer syndrome.    We reviewed the features of sporadic, familial, and hereditary cancers. In looking at Jack's family history, it is possible that a cancer susceptibility gene is present due to his father's pancreatic cancer history, his paternal aunt's pancreatic cancer history, and his paternal grandmother's pancreatic cancer and melanoma history as well as his paternal grandfather's pancreatic cancer history.    We discussed the natural history and genetics of hereditary cancers. A detailed handout regarding the information we discussed will be sent to Jack via [employer]. Topics included: inheritance pattern, cancer risks, cancer screening recommendations, and also risks, benefits and limitations of testing.    We discussed a few conditions that have been associated with increased risk for pancreatic  cancers.   We reviewed that the most common cause of hereditary breast cancer is Hereditary Breast and Ovarian Cancer (HBOC) syndrome, which is caused by mutations in the genes BRCA1 and BRCA2. Women with a mutation in either of these genes are at increased risk for breast and ovarian cancer. There is also an increased risk for a second primary breast cancer for women. Men with a mutation in either BRCA1 or BRCA2 are at increased risk for male breast and prostate cancer. Both women and men may also be at increased risk for pancreatic cancer and melanoma. We discussed that there are three mutations in the BRCA genes that are more common in the Ashkenazi Anglican population. About 1 in 40 individuals of Ashkenazi Anglican background have one of these three mutations, which account for about 90% of the BRCA mutations in this population. Catrachito shared that he has completed [facility] testing that did not identify any of the 3  mutations, although he understands that direct-to-consumer genetic testing is not comparable to clinical grade genetic testing.   Hernadez syndrome can be caused by a mutation in one of five genes:  MLH1, MSH2, MSH6, PMS2, and EPCAM.  Hernadez syndrome may present with polyps, but typically a small number.  The highest cancer risks associated with Hernadez syndrome include colon cancer, endometrial/uterine cancer, gastric cancer, and ovarian cancer as well as other gastrointestinal cancers such as pancreatic cancer.  Familial Atypical Multiple Mole Melanoma Syndrome (FAMMM) is caused by a single mutation in the CDKN2A gene.  The lifetime risk for melanoma is between 58-92% (PMID 09139135, PMID [id number]). Some studies indicate that these risks may vary, due to different factors. Individuals with FAMMM typically have many moles (more than 50), which can be atypical. People with FAMMM have increased risks for pancreatic cancer, and possibly other cancers. The exact risk of pancreatic cancer can depend on a  person s specific CDKN2A mutation, but has been reported as about 17% by age 75 by one larger study (PMID 21504339). We discussed screening for melanoma (frequent skin exams) and pancreatic cancer (endoscopic ultrasonography (EUS) and/or MRI/magnetic resonance).     Based on his personal and family history, Jack meets current National Comprehensive Cancer Network (NCCN) criteria for genetic testing of pancreatic cancer susceptibility genes.      We discussed that there are additional genes that could cause increased risk for pancreatic and related cancers. As many of these genes present with overlapping features in a family and accurate cancer risk cannot always be established based upon the pedigree analysis alone, it would be reasonable for Jack to consider panel genetic testing to analyze multiple genes at once.    We discussed genetic testing options for Jack given his personal and family history including a targeted panel of genes associated with increased risk for pancreatic cancers ([facility] Pancreatic Cancer panel) as well as expanded options including genes associated with common hereditary cancers ([facility] Common Hereditary Cancers panel; 47 genes) or genes associated with common and rare hereditary cancers ([facility] Multi-Cancer panel; 84 genes). We also discussed the option of adding on the genes associated with hereditary pancreatitis given his family history. After our discussion, Jack shared that he would like to learn as much information as possible from this testing. He opted to proceed with testing via the Multi-Cancer panel + Hereditary Pancreatitis genes through [facility].  The [facility] Multi-Cancer Panel analyzes 90 genes associated with an increased risk for cancer or chronic pancreatitis: AIP, ALK, APC, MARILEE, AXIN2, BAP1, BARD1, BLM, BMPR1A, BRCA1, BRCA2, BRIP1, CASR,CDC73, CDH1, CDK4, CDKN1B, CDKN1C, CDKN2A, CEBPA, CHEK2, CTNNA1, DICER1,DIS3L2, EGFR, EPCAM, FH, FLCN, GATA2, GPC3, GREM1, HOXB13,  HRAS, KIT, MAX,MEN1, MET, MITF, MLH1, MSH2, MSH3, MSH6, MUTYH, NBN, NF1, NF2, NTHL1,PALB2, PDGFRA, PHOX2B, PMS2, POLD1, POLE, POT1, GNBMF5Q, PTCH1, PTEN, RAD50,RAD51C, RAD51D, RB1, RECQL4, RET, RUNX1, SDHA, SDHAF2, SDHB, SDHC, SDHD,SMAD4, SMARCA4, SMARCB1, SMARCE1, STK11, SUFU, TERC, TERT, ICUD983, TP53,TSC1, TSC2, VHL, WRN, WT1 + pancreatitis genes (CASR, CFTR, CPA1, CTRC, PRSS1, SPINK1).  This panel includes genes associated with hereditary cancers across multiple major organ systems, including:  breast and gynecologic (breast, ovarian, uterine)  gastrointestinal (colorectal, gastric, pancreatic)  endocrine (thyroid, paraganglioma/pheochromocytoma, parathyroid, pituitary)  genitourinary (renal/urinary tract, prostate)  skin (melanoma, basal cell carcinoma)  brain/nervous system   sarcoma  hematologic (myelodysplastic syndrome/leukemia)  Individuals who are found to carry a pathogenic variant in one of these genes have an increased risk of developing certain cancers/tumors.  Identifying those at elevated risk may guide implementation of additional screening, surveillance and interventions.    Consent was obtained and genetic testing for the Multi-Cancers Panel [facility]. Turn around time: approximately 3-4 weeks.    Medical Management: For Jack, we reviewed that the information from genetic testing may determine:    additional cancer screening for which Jack may qualify (i.e. mammogram and breast MRI, more frequent colonoscopies, more frequent dermatologic exams, etc.),    and targeted chemotherapies for Jack if he were to develop certain cancers in the future (i.e. immunotherapy for individuals with Hernadez syndrome, PARP inhibitors, etc.).     These recommendations will be discussed in detail once genetic testing is completed.     Given the current COVID19 global pandemic, we discussed genetic testing options for Jack including having [facility] send a saliva kit to his home or coming into the clinic for  a blood draw. Jack opted to have a salivia kit sent to his home to complete the genetic testing. A kit was ordered from [facility] [facility] and a saliva collection kit will be sent to Jack's home address. I confirmed with Jack that the address we have on file is his current and correct address. We discussed some of the limitations of completing genetic testing via saliva and Jack was instructed to follow the instruction provided in the kit to ensure the best possibility of success for saliva genetic testing.     Plan:  1) Today Jack elected to proceed with Multi-Cancer panel + Hereditary pancreatitis panel genetic testing (90 genes) through [facility].  2) This information should be available in approximately 3 weeks from the time the lab receives his sample.  3) Jack will be contacted by our scheduling department to set up a result disclosure appointment.     Tiki Martínez MS, [facility] [place] [facility]  Licensed, Certified Genetic Counselor  [facility]  [phone]  [email or web address]

## 2022-03-22 NOTE — PROGRESS NOTES
3/22/2022    Referring Provider: Amina Mittal MD    Presenting Information:  I spoke to Jack by phone today to discuss his genetic testing results. He submitted a saliva sample to [facility] on 3/2/2022 and Multi-Cancer panel +Panceratitis gene panel testing was ordered. This testing was done because of Jack's family history of pancreatic cancer and pancreatitis.    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 We expected this given his daughter's diagnosis of Cystic Fibrosis. His mutation is called c.3846G>A (p.[id number]*). Pathogenic variants in CFTR may confer an elevated increased risk for chronic pancreatitis in heterozygous carriers, although the absolute risk of pancreatitis remains low (less than 1 in 100; PMID: 37969590). Hereditary pancreatitis is characterized by recurrent episodes of acute inflammation of the pancreas (pancreatitis) beginning in childhood or adolescence leading to chronic pancreatitis. Chronic pancreatitis is a risk factor for pancreatic cancer (PMID: 14465905).    A variant of uncertain significance was identified in the CASR gene. This variant is called c.2767C>G (p.Auj010Ykb). No other mutations/variants were identified in the CASR gene. This result is discussed in further detail at the end of this note.    Jack is negative for pathogenic (harmful) mutations in the AIP, ALK, APC, MARILEE, AXIN2, BAP1, BARD1, BLM, BMPR1A, BRCA1, BRCA2, BRIP1, CASR, CDC73, CDH1, CDK4, CDKN1B, CDKN1C, CDKN2A, CEBPA, CHEK2, CTNNA1, DICER1, DIS3L2, EGFR, EPCAM, FH, FLCN, GATA2, GPC3, GREM1, HOXB13, HRAS, KIT, MAX, MEN1, MET, MITF, MLH1, MSH2, MSH3, MSH6, MUTYH, NBN, NF1, NF2, NTHL1, PALB2, PDGFRA, PHOX2B, PMS2, POLD1, POLE, POT1, NIMIZ5V, PTCH1, PTEN, RAD50, RAD51C, RAD51D, RB1, RECQL4, RET, RUNX1, SDHA, SDHAF2, SDHB, SDHC, SDHD, SMAD4,  "SMARCA4, SMARCB1, SMARCE1, STK11, SUFU, TERC, TERT, OJLP135, TP53, TSC1, TSC2, VHL, WRN, WT1, CASR, CFTR, CPA1, CTRC, PRSS1, and SPINK1 genes.      No mutations were found in any of the 84 genes analyzed. This test involved sequencing and deletion/duplication analysis of all genes with the exceptions of EPCAM and GREM1 (deletions/duplications only), MITF (only the status of the c.952G>A (p.E318K) alteration is analyzed and reported), NTHL1 (sequencing only), EGFR (only the status of three alterations are analyzed and reported), HOXB13 (only the c.251G>A variant is reported).      A copy of the test report can be found in the Laboratory tab, dated 3/2/2022, and named \"LABORATORY MISCELLANEOUS ORDER\". The report is scanned in as a linked document.    Interpretation:  We discussed several different interpretations of this negative test result.    1. One explanation may be that there is a different gene or combination of genes and environment that are associated with the cancers in this family.  2. It is possible that his other relatives with did have a mutation in one of the genes above and he did not inherit it.  3. There is also a small possibility that there is a mutation in one of these genes, and the testing laboratory could not find it with their current testing methods.       Screening:  Based on this negative test result, it is important for Jack and his relatives to refer back to the family history for appropriate cancer screening.      Due to Jack s family history of pancreatic cancer significant for a father, paternal aunt, paternal grandmother, paternal grandfather, and maternal grandmother, is would be reasonable for Jack to consider pancreatic cancer screenoing. This screening typically involves endoscopic ultrasonography (EUS) and/or MRI/magnetic resonance cholangiopancreatography (Tatyana et al, Gut 2013. 62: 339-347; Roland S, et al., Am J Gastroenterol 2015. 110:223-262). Given the significant " limitations of this screening, Jack could consider meeting with his primary care provider to discuss this screening in more detail.     Other population cancer screening options, such as those recommended by the American Cancer Society and the National Comprehensive Cancer Network (NCCN), are also appropriate for Jack and his family. These screening recommendations may change if there are changes to Jack's personal and/or family history. Final screening recommendations should be made by each individual's managing physician.    Inheritance:  We reviewed the autosomal dominant inheritance of mutations in these 89 genes. We discussed that Jack cannot/did not pass on an identifiable mutation in these genes to his children based on this test result.  Mutations in these genes do not skip generations.      Additional Testing Considerations:  Although Jack's genetic testing result was negative, other relatives may still carry a gene mutation associated with pancreatic cancer. Genetic counseling is recommended for his sister and other paternal and maternal relatives to discuss genetic testing options. If any of these relatives do pursue genetic testing, Jack is encouraged to contact me so that we may review the impact of their test results on him.    Genetic Testing Result: Variant of Uncertain Significance (VUS)  Jack was also found to have a variant of uncertain significance (VUS) in the CASR gene. This variant is called c.2767C>G (p.Zgw208Wsz).  No other variants or mutations were found in the CASR gene. Given the uncertain significance of this result, medical management decisions should NOT be made based on this test result alone.    VUS Interpretation:  We discussed several different interpretations of this inconclusive test result. It is not clear if this variant in the CASR gene is associated with hereditary pancreatitis or other CASR-related conditions.  1. This variant may be a benign change that does not  increase risk for pancreatitis or other CASR-related conditions.  2. This variant may be a harmful mutation that causes hereditary pancreatitis or other CASR-related conditions.    The CASR gene is associated with a spectrum of disorders including autosomal dominant familial hypocalciuric hypercalcemia (FHH) (MedGenUID: 293572), autosomal dominant hypocalcemia (ADH) (MedGen UID: 40036), ADH with Bartter syndrome (MedGen UID: 449973), autosomal recessive  severe hyperparathyroidism (NSHPT) (MedGen UID: 468376), and possibly familial isolated hyperparathyroidism (FIHP)(PMID: 19938652, 19818347). There is also evidence suggesting CASR is associated with autosomal dominant idiopathic generalized epilepsy (PMID: 20652902) and chronic pancreatitis (PMID: 40289913, 04127729). The data, however, are preliminary and insufficient to make adetermination regarding these relationships.    The laboratory is working to determine if this variant is harmful or benign, and they will contact me if it is reclassified. If this variant is determined to be a benign change, there may be a different gene or combination of genes and environment that are associated with the cancers in Jack and/or his relatives.      Jack's relatives may also carry this VUS. Because it is unclear what, if any, risk is associated with this variant, clinical genetic testing for this CASR variant alone is not recommended for relatives.    Summary:  We do not have an explanation for Jack's family history of cancer. While no genetic changes were identified, Jack may still be at risk for certain cancers due to family history, environmental factors, or other genetic causes not identified by this test.  Because of that, it is important that he continue with cancer screening based on his personal and family history as discussed above.    Genetic testing is rapidly advancing, and new cancer susceptibility genes will most likely be identified in the future.   Therefore, I encouraged Jack to contact me annually or if there are changes in his personal or family history.  This may change how we assess his cancer risk, screening, and the testing we would offer.    Plan:  1. A copy of the test results will be released to Jack's email per his request.  2. He plans to follow-up with his primary care providers for routine care.  3. He should contact me regularly, or sooner if his family history changes.  4. A referral will be placed to the GI team (Dr. Thompson or Dr. Christianson) to discuss pancreatic cancer screening given his family history.   5. The laboratory will update me if the variant of uncertain significance is reclassified, and we will reach out to Jack to share any updates/recommendations.     If Jack has any further questions, I encouraged him to contact me at [phone].    Time spent on the phone: 15 minutes    Tiki Martínez MS, [facility] [place] [facility]  Licensed, Certified Genetic Counselor  [facility]  [phone]  [email or web address]

## 2022-03-23 ENCOUNTER — VIRTUAL VISIT (OUTPATIENT)
Dept: ONCOLOGY | Facility: CLINIC | Age: 42
End: 2022-03-23
Attending: GENETIC COUNSELOR, MS
Payer: COMMERCIAL

## 2022-03-23 DIAGNOSIS — Z80.0 FAMILY HISTORY OF PANCREATIC CANCER: Primary | ICD-10-CM

## 2022-03-23 DIAGNOSIS — Z83.79 FAMILY HISTORY OF PANCREATITIS: ICD-10-CM

## 2022-03-23 NOTE — LETTER
3/23/2022         RE: Jack Logan  [address] [place] [address]        Dear Colleague,    Thank you for referring your patient, Jack Logan, to the [facility]. Please see a copy of my visit note below.    3/22/2022    Referring Provider: Amina Mittal MD    Presenting Information:  I spoke to Jack by phone today to discuss his genetic testing results. He submitted a saliva sample to [facility] on 3/2/2022 and Multi-Cancer panel +Panceratitis gene panel testing was ordered. This testing was done because of Jack's family history of pancreatic cancer and pancreatitis.    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 We expected this given his daughter's diagnosis of Cystic Fibrosis. His mutation is called c.3846G>A (p.[id number]*). Pathogenic variants in CFTR may confer an elevated increased risk for chronic pancreatitis in heterozygous carriers, although the absolute risk of pancreatitis remains low (less than 1 in 100; PMID: 52668957). Hereditary pancreatitis is characterized by recurrent episodes of acute inflammation of the pancreas (pancreatitis) beginning in childhood or adolescence leading to chronic pancreatitis. Chronic pancreatitis is a risk factor for pancreatic cancer (PMID: 95677988).    A variant of uncertain significance was identified in the CASR gene. This variant is called c.2767C>G (p.Apx846Piv). No other mutations/variants were identified in the CASR gene. This result is discussed in further detail at the end of this note.    Jack is negative for pathogenic (harmful) mutations in the AIP, ALK, APC, MARILEE, AXIN2, BAP1, BARD1, BLM, BMPR1A, BRCA1, BRCA2, BRIP1, CASR, CDC73, CDH1, CDK4, CDKN1B, CDKN1C, CDKN2A, CEBPA, CHEK2, CTNNA1, DICER1, DIS3L2, EGFR, EPCAM, FH, FLCN, GATA2, GPC3,  "GREM1, HOXB13, HRAS, KIT, MAX, MEN1, MET, MITF, MLH1, MSH2, MSH3, MSH6, MUTYH, NBN, NF1, NF2, NTHL1, PALB2, PDGFRA, PHOX2B, PMS2, POLD1, POLE, POT1, LRXBR3J, PTCH1, PTEN, RAD50, RAD51C, RAD51D, RB1, RECQL4, RET, RUNX1, SDHA, SDHAF2, SDHB, SDHC, SDHD, SMAD4, SMARCA4, SMARCB1, SMARCE1, STK11, SUFU, TERC, TERT, KEEH952, TP53, TSC1, TSC2, VHL, WRN, WT1, CASR, CFTR, CPA1, CTRC, PRSS1, and SPINK1 genes.      No mutations were found in any of the 84 genes analyzed. This test involved sequencing and deletion/duplication analysis of all genes with the exceptions of EPCAM and GREM1 (deletions/duplications only), MITF (only the status of the c.952G>A (p.E318K) alteration is analyzed and reported), NTHL1 (sequencing only), EGFR (only the status of three alterations are analyzed and reported), HOXB13 (only the c.251G>A variant is reported).      A copy of the test report can be found in the Laboratory tab, dated 3/2/2022, and named \"LABORATORY MISCELLANEOUS ORDER\". The report is scanned in as a linked document.    Interpretation:  We discussed several different interpretations of this negative test result.    1. One explanation may be that there is a different gene or combination of genes and environment that are associated with the cancers in this family.  2. It is possible that his other relatives with did have a mutation in one of the genes above and he did not inherit it.  3. There is also a small possibility that there is a mutation in one of these genes, and the testing laboratory could not find it with their current testing methods.       Screening:  Based on this negative test result, it is important for Jack and his relatives to refer back to the family history for appropriate cancer screening.      Due to Jack s family history of pancreatic cancer significant for a father, paternal aunt, paternal grandmother, paternal grandfather, and maternal grandmother, is would be reasonable for Jack to consider pancreatic " cancer screenoing. This screening typically involves endoscopic ultrasonography (EUS) and/or MRI/magnetic resonance cholangiopancreatography (Tatyana et al, Gut 2013. 62: 339-347; Roland S, et al., Am J Gastroenterol 2015. 110:223-262). Given the significant limitations of this screening, Jack could consider meeting with his primary care provider to discuss this screening in more detail.     Other population cancer screening options, such as those recommended by the American Cancer Society and the National Comprehensive Cancer Network (NCCN), are also appropriate for Jack and his family. These screening recommendations may change if there are changes to Jack's personal and/or family history. Final screening recommendations should be made by each individual's managing physician.    Inheritance:  We reviewed the autosomal dominant inheritance of mutations in these 89 genes. We discussed that Jack cannot/did not pass on an identifiable mutation in these genes to his children based on this test result.  Mutations in these genes do not skip generations.      Additional Testing Considerations:  Although Jack's genetic testing result was negative, other relatives may still carry a gene mutation associated with pancreatic cancer. Genetic counseling is recommended for his sister and other paternal and maternal relatives to discuss genetic testing options. If any of these relatives do pursue genetic testing, Jack is encouraged to contact me so that we may review the impact of their test results on him.    Genetic Testing Result: Variant of Uncertain Significance (VUS)  Jack was also found to have a variant of uncertain significance (VUS) in the CASR gene. This variant is called c.2767C>G (p.Wze990Pvb).  No other variants or mutations were found in the CASR gene. Given the uncertain significance of this result, medical management decisions should NOT be made based on this test result alone.    VUS Interpretation:  We  discussed several different interpretations of this inconclusive test result. It is not clear if this variant in the CASR gene is associated with hereditary pancreatitis or other CASR-related conditions.  1. This variant may be a benign change that does not increase risk for pancreatitis or other CASR-related conditions.  2. This variant may be a harmful mutation that causes hereditary pancreatitis or other CASR-related conditions.    The CASR gene is associated with a spectrum of disorders including autosomal dominant familial hypocalciuric hypercalcemia (FHH) (MedGenUID: 718892), autosomal dominant hypocalcemia (ADH) (MedGen UID: 61876), ADH with Bartter syndrome (MedGen UID: 056998), autosomal recessive  severe hyperparathyroidism (NSHPT) (MedGen UID: 367064), and possibly familial isolated hyperparathyroidism (FIHP)(PMID: 53608667, 01627763). There is also evidence suggesting CASR is associated with autosomal dominant idiopathic generalized epilepsy (PMID: 69292110) and chronic pancreatitis (PMID: 57411601, 33850718). The data, however, are preliminary and insufficient to make adetermination regarding these relationships.    The laboratory is working to determine if this variant is harmful or benign, and they will contact me if it is reclassified. If this variant is determined to be a benign change, there may be a different gene or combination of genes and environment that are associated with the cancers in Jack and/or his relatives.      Jack's relatives may also carry this VUS. Because it is unclear what, if any, risk is associated with this variant, clinical genetic testing for this CASR variant alone is not recommended for relatives.    Summary:  We do not have an explanation for Jack's family history of cancer. While no genetic changes were identified, Jack may still be at risk for certain cancers due to family history, environmental factors, or other genetic causes not identified by this test.   Because of that, it is important that he continue with cancer screening based on his personal and family history as discussed above.    Genetic testing is rapidly advancing, and new cancer susceptibility genes will most likely be identified in the future.  Therefore, I encouraged Jack to contact me annually or if there are changes in his personal or family history.  This may change how we assess his cancer risk, screening, and the testing we would offer.    Plan:  1. A copy of the test results will be released to Jack's email per his request.  2. He plans to follow-up with his primary care providers for routine care.  3. He should contact me regularly, or sooner if his family history changes.  4. A referral will be placed to the GI team (Dr. Thompson or Dr. Christianson) to discuss pancreatic cancer screening given his family history.   5. The laboratory will update me if the variant of uncertain significance is reclassified, and we will reach out to Jack to share any updates/recommendations.     If Jack has any further questions, I encouraged him to contact me at [phone].    Time spent on the phone: 15 minutes          Again, thank you for allowing me to participate in the care of your patient.      Sincerely,    Tiki Zhou GC

## 2022-04-12 NOTE — ADDENDUM NOTE
Addendum Note by Beth oCsta MD at 7/20/2017 12:48 PM     Author: Beth Costa MD Service: -- Author Type: Physician    Filed: 7/20/2017 12:48 PM Encounter Date: 7/20/2017 Status: Signed    : Beth Costa MD (Physician)    Addended by: BETH COSTA on: 7/20/2017 12:48 PM        Modules accepted: Orders         Quality 130: Documentation Of Current Medications In The Medical Record: Current Medications Documented Quality 110: Preventive Care And Screening: Influenza Immunization: Influenza immunization was not ordered or administered, reason not given Quality 226: Preventive Care And Screening: Tobacco Use: Screening And Cessation Intervention: Patient screened for tobacco use and is an ex/non-smoker Detail Level: Detailed Quality 431: Preventive Care And Screening: Unhealthy Alcohol Use - Screening: Patient not identified as an unhealthy alcohol user when screened for unhealthy alcohol use using a systematic screening method

## 2022-05-05 NOTE — PROGRESS NOTES
"Catrachito is a 41 year old who is being evaluated via a billable video visit.      How would you like to obtain your AVS? MyChart  If the video visit is dropped, the invitation should be resent by: Text to cell phone: [phone]  Will anyone else be joining your video visit? spouse      Video Start Time: 7:23 AM    Assessment & Plan   Problem List Items Addressed This Visit    None     Visit Diagnoses     Clinical diagnosis of COVID-19    -  Primary    Relevant Medications    nirmatrelvir and ritonavir (PAXLOVID) therapy pack    Screening for HIV (human immunodeficiency virus)        Need for hepatitis C screening test                        BMI:   Estimated body mass index is 33.45 kg/m  as calculated from the following:    Height as of 2/14/22: 1.727 m (5' 8\").    Weight as of 2/14/22: 99.8 kg (220 lb).       COVID-19 positive patient.  Encounter for consideration of medication intervention. Patient does qualify for a prescription. Full discussion with patient including medication options, risks and benefits. Potential drug interactions reviewed with patient.     Treatment Planned Paxlovid, Rx sent to [place] pharmacy  [facility]   [phone]    [address] [place] [address]    Hours:  Mon-Fri: 8:30a - 5:00p  Sat-Sun: 9:00a - 1:00p    Drive-thru available   Temporary change to home medications: Trazodone    Estimated body mass index is 33.45 kg/m  as calculated from the following:    Height as of 2/14/22: 1.727 m (5' 8\").    Weight as of 2/14/22: 99.8 kg (220 lb).  GFR Estimate   Date Value Ref Range Status   12/28/2021 >90 >60 mL/min/1.73m2 Final     Comment:     Effective December 21, 2021 eGFRcr in adults is calculated using the 2021 CKD-EPI creatinine equation which includes age and gender (Luzmaria crow al., NEJM, DOI: 10.1056/VUVNax6940767)   07/29/2019 >60 >60 mL/min/1.73m2 Final     Lab Results   Component Value Date    UIHLT12NEL Negative 12/17/2021       No follow-ups on " file.    Joann Drake NP  [facility] JOSEOCTAVIA Winston is a 41 year old who presents for the following health issues     HPI       COVID-19 Symptom Review  How many days ago did these symptoms start? 5.5.2022. positive test 5.5.2022    Are any of the following symptoms significant for you?    New or worsening difficulty breathing? No    Worsening cough? Yes, I am coughing up mucus.    Fever or chills? Yes, I felt feverish or had chills.    Headache: YES    Sore throat: YES    Chest pain: no    Diarrhea: no    Body aches? YES    What treatments has patient tried? Nonsteroidals   Does patient live in a nursing home, group home, or shelter? no  Does patient have a way to get food/medications during quarantined? Yes, I have a friend or family member who can help me.      Current Outpatient Medications:      albuterol (PROAIR HFA;PROVENTIL HFA;VENTOLIN HFA) 90 mcg/actuation inhaler, [ALBUTEROL (PROAIR HFA;PROVENTIL HFA;VENTOLIN HFA) 90 MCG/ACTUATION INHALER] Inhale 2 puffs every 4 (four) hours as needed for wheezing., Disp: 1 Inhaler, Rfl: 0     cetirizine (ZYRTEC) 10 MG tablet, [CETIRIZINE (ZYRTEC) 10 MG TABLET] Take 10 mg by mouth daily., Disp: , Rfl:      desonide (DESOWEN) 0.05 % ointment, [DESONIDE (DESOWEN) 0.05 % OINTMENT] Apply topically 2 (two) times a day., Disp: , Rfl:      fluticasone (FLONASE) 50 mcg/actuation nasal spray, [FLUTICASONE (FLONASE) 50 MCG/ACTUATION NASAL SPRAY] 1 spray into each nostril daily., Disp: , Rfl:      gabapentin (NEURONTIN) 100 MG capsule, TAKE ONE CAPSULE BY MOUTH TWICE A DAY AND TAKE ONE CAPSULEDAILY AS NEEDED FOR ANXIETY, Disp: , Rfl:      ketoconazole (NIZORAL) 2 % cream, [KETOCONAZOLE (NIZORAL) 2 % CREAM] Apply topically daily., Disp: , Rfl:      loxapine (LOXITANE) 10 MG capsule, TAKE ONE CAPSULE BY MOUTH AT BEDTIME FOR CLEAR THINKING AND MOOD, Disp: , Rfl:      traZODone (DESYREL) 50 MG tablet, [TRAZODONE (DESYREL) 50 MG TABLET] Take 50 mg by  mouth bedtime., Disp: , Rfl:      venlafaxine (EFFEXOR) 75 MG tablet, [VENLAFAXINE (EFFEXOR) 75 MG TABLET] Take 75 mg by mouth 2 (two) times a day., Disp: , Rfl:   Past Medical History:   Diagnosis Date     ADHD (attention deficit hyperactivity disorder)      ADHD (attention deficit hyperactivity disorder)      Allergic rhinitis, cause unspecified      Allergic rhinitis, cause unspecified      Anxiety      Anxiety      Asthma      Asthma      Cystic fibrosis carrier      Cystic fibrosis carrier      Depression      Depression      Depressive disorder     For as long as I can remember     Gastroesophageal reflux disease      Hearing loss      Hearing loss      Liver disease      Motion sickness      Obesity      Obesity      Onychomycosis      Onychomycosis      Sleep apnea            Review of Systems   Unremarkable other than listed above and below         Objective           Vitals:  No vitals were obtained today due to virtual visit.    Physical Exam   GENERAL: Healthy, alert and no distress  EYES: Eyes grossly normal to inspection.  No discharge or erythema, or obvious scleral/conjunctival abnormalities.  RESP: No audible wheeze, cough, or visible cyanosis.  No visible retractions or increased work of breathing.    SKIN: Visible skin clear. No significant rash, abnormal pigmentation or lesions.  NEURO: Cranial nerves grossly intact.  Mentation and speech appropriate for age.  PSYCH: Mentation appears normal, affect normal/bright, judgement and insight intact, normal speech and appearance well-groomed.          Current Outpatient Medications:      albuterol (PROAIR HFA;PROVENTIL HFA;VENTOLIN HFA) 90 mcg/actuation inhaler, [ALBUTEROL (PROAIR HFA;PROVENTIL HFA;VENTOLIN HFA) 90 MCG/ACTUATION INHALER] Inhale 2 puffs every 4 (four) hours as needed for wheezing., Disp: 1 Inhaler, Rfl: 0     cetirizine (ZYRTEC) 10 MG tablet, [CETIRIZINE (ZYRTEC) 10 MG TABLET] Take 10 mg by mouth daily., Disp: , Rfl:      fluticasone  (FLONASE) 50 mcg/actuation nasal spray, [FLUTICASONE (FLONASE) 50 MCG/ACTUATION NASAL SPRAY] 1 spray into each nostril daily., Disp: , Rfl:      gabapentin (NEURONTIN) 100 MG capsule, TAKE ONE CAPSULE BY MOUTH TWICE A DAY AND TAKE ONE CAPSULEDAILY AS NEEDED FOR ANXIETY, Disp: , Rfl:      loxapine (LOXITANE) 10 MG capsule, TAKE ONE CAPSULE BY MOUTH AT BEDTIME FOR CLEAR THINKING AND MOOD, Disp: , Rfl:      nirmatrelvir and ritonavir (PAXLOVID) therapy pack, Take 3 tablets by mouth 2 times daily for 5 days Take 2 Nirmatrelvir tablets and 1 Ritonavir tablet twice daily for 5 days., Disp: 30 each, Rfl: 0     PARoxetine (PAXIL) 40 MG tablet, Take 40 mg by mouth every morning, Disp: , Rfl:      traZODone (DESYREL) 50 MG tablet, [TRAZODONE (DESYREL) 50 MG TABLET] Take 50 mg by mouth bedtime., Disp: , Rfl:   Past Medical History:   Diagnosis Date     ADHD (attention deficit hyperactivity disorder)      ADHD (attention deficit hyperactivity disorder)      Allergic rhinitis, cause unspecified      Allergic rhinitis, cause unspecified      Anxiety      Anxiety      Asthma      Asthma      Cystic fibrosis carrier      Cystic fibrosis carrier      Depression      Depression      Depressive disorder     For as long as I can remember     Gastroesophageal reflux disease      Hearing loss      Hearing loss      Liver disease      Motion sickness      Obesity      Obesity      Onychomycosis      Onychomycosis      Sleep apnea              Video-Visit Details    Type of service:  Video Visit    Video End Time:7:29 AM    Originating Location (pt. Location): Home    Distant Location (provider location):  [facility]     Platform used for Video Visit: Showcase

## 2022-05-06 ENCOUNTER — MYC MEDICAL ADVICE (OUTPATIENT)
Dept: INTERNAL MEDICINE | Facility: CLINIC | Age: 42
End: 2022-05-06

## 2022-05-06 ENCOUNTER — VIRTUAL VISIT (OUTPATIENT)
Dept: INTERNAL MEDICINE | Facility: CLINIC | Age: 42
End: 2022-05-06
Payer: COMMERCIAL

## 2022-05-06 DIAGNOSIS — Z11.4 SCREENING FOR HIV (HUMAN IMMUNODEFICIENCY VIRUS): ICD-10-CM

## 2022-05-06 DIAGNOSIS — U07.1 CLINICAL DIAGNOSIS OF COVID-19: Primary | ICD-10-CM

## 2022-05-06 DIAGNOSIS — Z11.59 NEED FOR HEPATITIS C SCREENING TEST: ICD-10-CM

## 2022-05-06 PROCEDURE — 99213 OFFICE O/P EST LOW 20 MIN: CPT | Mod: GT | Performed by: NURSE PRACTITIONER

## 2022-05-06 RX ORDER — PAROXETINE 40 MG/1
40 TABLET, FILM COATED ORAL EVERY MORNING
COMMUNITY

## 2022-05-09 DIAGNOSIS — U07.1 CLINICAL DIAGNOSIS OF COVID-19: ICD-10-CM

## 2022-05-09 NOTE — TELEPHONE ENCOUNTER
Fax from pharmacy stating they do not fill frank'd up medication. Pharmacy location corrected.     Jack Hair Jr., CMA on 5/9/2022 at 1:18 PM

## 2022-05-09 NOTE — TELEPHONE ENCOUNTER
Left message for patient that Rx has been sent to the [facility] pharmacy.  To return call if he has further questions.

## 2022-06-07 ENCOUNTER — LAB REQUISITION (OUTPATIENT)
Dept: LAB | Facility: CLINIC | Age: 42
End: 2022-06-07

## 2022-06-07 PROCEDURE — 82595 ASSAY OF CRYOGLOBULIN: CPT

## 2022-06-13 LAB — CRYOGLOB SER QL: NEGATIVE

## 2022-07-05 ENCOUNTER — OFFICE VISIT (OUTPATIENT)
Dept: FAMILY MEDICINE | Facility: CLINIC | Age: 42
End: 2022-07-05
Payer: COMMERCIAL

## 2022-07-05 VITALS
HEART RATE: 74 BPM | TEMPERATURE: 98.1 F | WEIGHT: 241.6 LBS | SYSTOLIC BLOOD PRESSURE: 120 MMHG | RESPIRATION RATE: 16 BRPM | DIASTOLIC BLOOD PRESSURE: 80 MMHG | OXYGEN SATURATION: 96 % | BODY MASS INDEX: 34.59 KG/M2 | HEIGHT: 70 IN

## 2022-07-05 DIAGNOSIS — F90.0 ATTENTION DEFICIT HYPERACTIVITY DISORDER (ADHD), PREDOMINANTLY INATTENTIVE TYPE: ICD-10-CM

## 2022-07-05 DIAGNOSIS — F31.89 OTHER BIPOLAR DISORDER (H): ICD-10-CM

## 2022-07-05 DIAGNOSIS — Z11.4 SCREENING FOR HIV (HUMAN IMMUNODEFICIENCY VIRUS): ICD-10-CM

## 2022-07-05 DIAGNOSIS — Z11.59 NEED FOR HEPATITIS C SCREENING TEST: ICD-10-CM

## 2022-07-05 DIAGNOSIS — E66.01 MORBID OBESITY (H): ICD-10-CM

## 2022-07-05 DIAGNOSIS — F84.5 ASPERGER'S DISORDER: Primary | ICD-10-CM

## 2022-07-05 DIAGNOSIS — G47.33 OBSTRUCTIVE SLEEP APNEA OF ADULT: ICD-10-CM

## 2022-07-05 PROCEDURE — 99214 OFFICE O/P EST MOD 30 MIN: CPT | Performed by: FAMILY MEDICINE

## 2022-07-05 RX ORDER — DEXTROAMPHETAMINE SACCHARATE, AMPHETAMINE ASPARTATE MONOHYDRATE, DEXTROAMPHETAMINE SULFATE AND AMPHETAMINE SULFATE 2.5; 2.5; 2.5; 2.5 MG/1; MG/1; MG/1; MG/1
10 CAPSULE, EXTENDED RELEASE ORAL DAILY
Qty: 30 CAPSULE | Refills: 0 | Status: SHIPPED | OUTPATIENT
Start: 2022-07-05 | End: 2022-08-03

## 2022-07-05 ASSESSMENT — ASTHMA QUESTIONNAIRES
QUESTION_3 LAST FOUR WEEKS HOW OFTEN DID YOUR ASTHMA SYMPTOMS (WHEEZING, COUGHING, SHORTNESS OF BREATH, CHEST TIGHTNESS OR PAIN) WAKE YOU UP AT NIGHT OR EARLIER THAN USUAL IN THE MORNING: NOT AT ALL
QUESTION_1 LAST FOUR WEEKS HOW MUCH OF THE TIME DID YOUR ASTHMA KEEP YOU FROM GETTING AS MUCH DONE AT WORK, SCHOOL OR AT HOME: NONE OF THE TIME
QUESTION_5 LAST FOUR WEEKS HOW WOULD YOU RATE YOUR ASTHMA CONTROL: WELL CONTROLLED
ACT_TOTALSCORE: 23
QUESTION_2 LAST FOUR WEEKS HOW OFTEN HAVE YOU HAD SHORTNESS OF BREATH: NOT AT ALL
ACT_TOTALSCORE: 23
QUESTION_4 LAST FOUR WEEKS HOW OFTEN HAVE YOU USED YOUR RESCUE INHALER OR NEBULIZER MEDICATION (SUCH AS ALBUTEROL): ONCE A WEEK OR LESS

## 2022-07-05 ASSESSMENT — ANXIETY QUESTIONNAIRES
2. NOT BEING ABLE TO STOP OR CONTROL WORRYING: MORE THAN HALF THE DAYS
7. FEELING AFRAID AS IF SOMETHING AWFUL MIGHT HAPPEN: MORE THAN HALF THE DAYS
3. WORRYING TOO MUCH ABOUT DIFFERENT THINGS: MORE THAN HALF THE DAYS
7. FEELING AFRAID AS IF SOMETHING AWFUL MIGHT HAPPEN: MORE THAN HALF THE DAYS
6. BECOMING EASILY ANNOYED OR IRRITABLE: MORE THAN HALF THE DAYS
1. FEELING NERVOUS, ANXIOUS, OR ON EDGE: MORE THAN HALF THE DAYS
GAD7 TOTAL SCORE: 13
8. IF YOU CHECKED OFF ANY PROBLEMS, HOW DIFFICULT HAVE THESE MADE IT FOR YOU TO DO YOUR WORK, TAKE CARE OF THINGS AT HOME, OR GET ALONG WITH OTHER PEOPLE?: VERY DIFFICULT
5. BEING SO RESTLESS THAT IT IS HARD TO SIT STILL: SEVERAL DAYS
GAD7 TOTAL SCORE: 13
4. TROUBLE RELAXING: MORE THAN HALF THE DAYS
GAD7 TOTAL SCORE: 13

## 2022-07-05 ASSESSMENT — PATIENT HEALTH QUESTIONNAIRE - PHQ9
SUM OF ALL RESPONSES TO PHQ QUESTIONS 1-9: 19
SUM OF ALL RESPONSES TO PHQ QUESTIONS 1-9: 19
10. IF YOU CHECKED OFF ANY PROBLEMS, HOW DIFFICULT HAVE THESE PROBLEMS MADE IT FOR YOU TO DO YOUR WORK, TAKE CARE OF THINGS AT HOME, OR GET ALONG WITH OTHER PEOPLE: VERY DIFFICULT

## 2022-07-05 ASSESSMENT — PAIN SCALES - GENERAL: PAINLEVEL: NO PAIN (0)

## 2022-07-05 ASSESSMENT — ENCOUNTER SYMPTOMS: FATIGUE: 1

## 2022-07-05 NOTE — PROGRESS NOTES
Assessment & Plan     Asperger's disorder  -Patient is currently struggling with his mental health in relation to his numerous psychiatric diagnoses.  He would like more help in understanding his Asperger's disorder as well as clarifying diagnosis for his ADHD and Asperger's disorder.  His therapist would like him to undergo neuropsych testing in their system is out several months I will refer to ours to see if they have got more availability.  - Adult Neuropsychology Referral    Other bipolar disorder (H)  -Patient has had some question about his mental health diagnoses as well.  For now it is listed as other bipolar disorder but certainly could be major depression as well.  Currently he is not sure he feels that life is worth living but also does not have an active plan for suicide.  He will continue working with his therapist and I will refer to mental health as well here.  He understands going to the ER if he is in acute crisis.  -He will follow-up with me in 2 weeks for recheck.  - Adult Mental Health  Referral    Attention deficit hyperactivity disorder (ADHD), predominantly inattentive type  -Patient has a documented history of ADHD from when he was a child.  He is not on current medications and struggling with significant fatigue.  We will start him on low-dose Adderall and have him follow-up with me in a few weeks for recheck.  - Adult Neuropsychology Referral  - Adult Mental Health  Referral  - amphetamine-dextroamphetamine (ADDERALL XR) 10 MG 24 hr capsule  Dispense: 30 capsule; Refill: 0    Morbid obesity (H)  -Struggling with weight management at this time as he is relatively immobile.    Obstructive sleep apnea of adult    Screening for HIV (human immunodeficiency virus)  - HIV Antigen Antibody Combo    Need for hepatitis C screening test  - Hepatitis C Screen Reflex to HCV RNA Quant and Genotype        35 minutes spent on the date of the encounter doing chart review, history and  "exam, documentation and further activities per the note       BMI:   Estimated body mass index is 35.17 kg/m  as calculated from the following:    Height as of this encounter: 1.765 m (5' 9.5\").    Weight as of this encounter: 109.6 kg (241 lb 9.6 oz).   Weight management plan: Discussed healthy diet and exercise guidelines    Depression Screening Follow Up    PHQ 7/5/2022   PHQ-9 Total Score 19   Q9: Thoughts of better off dead/self-harm past 2 weeks Several days   F/U: Thoughts of suicide or self-harm No   F/U: Safety concerns No                 Follow Up        Follow Up Actions Taken  Crisis resource information provided in the After Visit Summary  Referred patient back to mental health provider  Patient to follow up with PCP.  Clinic staff to schedule appointment if able.    Discussed the following ways the patient can remain in a safe environment:  be around others      No follow-ups on file.    Amina Mittal MD  [facility] CHINO Winston is a 41 year old, presenting for the following health issues:  Fatigue (Chronic issue)      Fatigue  Associated symptoms include fatigue.   History of Present Illness       Mental Health Follow-up:  Patient presents to follow-up on Depression & Anxiety.Patient's depression since last visit has been:  Worse  The patient is not having other symptoms associated with depression.  Patient's anxiety since last visit has been:  Worse  The patient is not having other symptoms associated with anxiety.  Any significant life events: relationship concerns  Patient is feeling anxious or having panic attacks.  Patient has no concerns about alcohol or drug use.    Headaches:   Since the patient's last clinic visit, headaches are: worsened  The patient is getting headaches:  2  He is able to do normal daily activities when he has a migraine.  The patient is taking the following rescue/relief medications:  Ibuprofen (Advil, Motrin)   Patient states \"I " "get some relief\" from the rescue/relief medications.   The patient is taking the following medications to prevent migraines:  No medications to prevent migraines  In the past 4 weeks, the patient has gone to an Urgent Care or Emergency Room 0 times times due to headaches.    Reason for visit:  Chronic fatigue    He eats 2-3 servings of fruits and vegetables daily.He consumes 1 sweetened beverage(s) daily.He exercises with enough effort to increase his heart rate 10 to 19 minutes per day.  He exercises with enough effort to increase his heart rate 3 or less days per week.   He is taking medications regularly.    Today's PHQ-9         PHQ-9 Total Score: 19    PHQ-9 Q9 Thoughts of better off dead/self-harm past 2 weeks :   Several days  Thoughts of suicide or self harm: (P) No  Self-harm Plan:     Self-harm Action:       Safety concerns for self or others: (P) No    How difficult have these problems made it for you to do your work, take care of things at home, or get along with other people: Very difficult  Today's JESSICA-7 Score: 13     He continues to struggle with chronic fatigue. He does find that this is all consuming for him and his day to day activity. He has cut out his meidcations that make him tired and it's no different. He does     He and his wife are . He does have ADHD and he does have Autism as well.     He does use his CPAP with sleeping and napping. He does feel that it is working. When he doesn't use this he does wake up quite a bit, he does take the trazodone to help him sleep with machine on. He is going back to the sleep clinic next month to have his machine looked at.     He is working with a psychotherapist right now weekly and is doing CBT. He doesn't feel like people are listening to his complaints. He does feel that he is not being listented to. He did get admitted in October after taking alcohol and pills and was in the hospital for 3 days.     He did have neuropsych testing at [facility]" "about 8 years ago. His current therapist does work at [employer] and does think that he would benefit from new neuropsych testing. She is hoping that this would help with his treatment. He did have ADHD when he was a young kid, he didn't have a follow up after the testing. When he was a kid he took Ritalin. He did have this taken away as when he started middle school he wasn't taken it consistently.             Review of Systems   Constitutional: Positive for fatigue.         Objective    /80 (BP Location: Left arm, Patient Position: Sitting, Cuff Size: Adult Large)   Pulse 74   Temp 98.1  F (36.7  C) (Oral)   Resp 16   Ht 1.765 m (5' 9.5\")   Wt 109.6 kg (241 lb 9.6 oz)   SpO2 96%   BMI 35.17 kg/m    Body mass index is 35.17 kg/m .  Physical Exam   GENERAL: healthy, alert and no distress  PSYCH: tangential, affect flat, tearful and appearance casual      "

## 2022-07-07 ENCOUNTER — MYC MEDICAL ADVICE (OUTPATIENT)
Dept: FAMILY MEDICINE | Facility: CLINIC | Age: 42
End: 2022-07-07

## 2022-07-11 ASSESSMENT — COLUMBIA-SUICIDE SEVERITY RATING SCALE - C-SSRS
2. IN THE PAST MONTH, HAVE YOU ACTUALLY HAD ANY THOUGHTS OF KILLING YOURSELF?: NO
1. WITHIN THE PAST MONTH, HAVE YOU WISHED YOU WERE DEAD OR WISHED YOU COULD GO TO SLEEP AND NOT WAKE UP?: YES
6. HAVE YOU EVER DONE ANYTHING, STARTED TO DO ANYTHING, OR PREPARED TO DO ANYTHING TO END YOUR LIFE?: NO

## 2022-07-13 ENCOUNTER — VIRTUAL VISIT (OUTPATIENT)
Dept: PSYCHIATRY | Facility: CLINIC | Age: 42
End: 2022-07-13
Attending: FAMILY MEDICINE
Payer: COMMERCIAL

## 2022-07-13 VITALS — HEIGHT: 70 IN | WEIGHT: 225 LBS | BODY MASS INDEX: 32.21 KG/M2

## 2022-07-13 DIAGNOSIS — F31.89 OTHER BIPOLAR DISORDER (H): ICD-10-CM

## 2022-07-13 DIAGNOSIS — F33.41 MAJOR DEPRESSIVE DISORDER, RECURRENT EPISODE, IN PARTIAL REMISSION (H): ICD-10-CM

## 2022-07-13 DIAGNOSIS — F90.0 ATTENTION DEFICIT HYPERACTIVITY DISORDER (ADHD), PREDOMINANTLY INATTENTIVE TYPE: Primary | ICD-10-CM

## 2022-07-13 PROCEDURE — 99205 OFFICE O/P NEW HI 60 MIN: CPT | Mod: 95 | Performed by: NURSE PRACTITIONER

## 2022-07-13 RX ORDER — DEXTROAMPHETAMINE SACCHARATE, AMPHETAMINE ASPARTATE MONOHYDRATE, DEXTROAMPHETAMINE SULFATE AND AMPHETAMINE SULFATE 2.5; 2.5; 2.5; 2.5 MG/1; MG/1; MG/1; MG/1
10 CAPSULE, EXTENDED RELEASE ORAL EVERY MORNING
Qty: 30 CAPSULE | Refills: 0 | Status: SHIPPED | OUTPATIENT
Start: 2022-08-03 | End: 2022-09-02

## 2022-07-13 RX ORDER — PAROXETINE 40 MG/1
40 TABLET, FILM COATED ORAL DAILY
Qty: 30 TABLET | Refills: 2 | Status: SHIPPED | OUTPATIENT
Start: 2022-07-13

## 2022-07-13 RX ORDER — DEXTROAMPHETAMINE SACCHARATE, AMPHETAMINE ASPARTATE MONOHYDRATE, DEXTROAMPHETAMINE SULFATE AND AMPHETAMINE SULFATE 2.5; 2.5; 2.5; 2.5 MG/1; MG/1; MG/1; MG/1
10 CAPSULE, EXTENDED RELEASE ORAL EVERY MORNING
Qty: 30 CAPSULE | Refills: 0 | Status: SHIPPED | OUTPATIENT
Start: 2022-10-01 | End: 2022-10-31

## 2022-07-13 RX ORDER — TRAZODONE HYDROCHLORIDE 50 MG/1
TABLET, FILM COATED ORAL
Qty: 30 TABLET | Refills: 1 | Status: SHIPPED | OUTPATIENT
Start: 2022-07-13

## 2022-07-13 RX ORDER — DEXTROAMPHETAMINE SACCHARATE, AMPHETAMINE ASPARTATE MONOHYDRATE, DEXTROAMPHETAMINE SULFATE AND AMPHETAMINE SULFATE 2.5; 2.5; 2.5; 2.5 MG/1; MG/1; MG/1; MG/1
10 CAPSULE, EXTENDED RELEASE ORAL DAILY
Qty: 30 CAPSULE | Refills: 0 | Status: SHIPPED | OUTPATIENT
Start: 2022-09-03

## 2022-07-13 ASSESSMENT — ANXIETY QUESTIONNAIRES
IF YOU CHECKED OFF ANY PROBLEMS ON THIS QUESTIONNAIRE, HOW DIFFICULT HAVE THESE PROBLEMS MADE IT FOR YOU TO DO YOUR WORK, TAKE CARE OF THINGS AT HOME, OR GET ALONG WITH OTHER PEOPLE: EXTREMELY DIFFICULT
7. FEELING AFRAID AS IF SOMETHING AWFUL MIGHT HAPPEN: SEVERAL DAYS
3. WORRYING TOO MUCH ABOUT DIFFERENT THINGS: SEVERAL DAYS
1. FEELING NERVOUS, ANXIOUS, OR ON EDGE: SEVERAL DAYS
2. NOT BEING ABLE TO STOP OR CONTROL WORRYING: SEVERAL DAYS
6. BECOMING EASILY ANNOYED OR IRRITABLE: SEVERAL DAYS
GAD7 TOTAL SCORE: 7
4. TROUBLE RELAXING: SEVERAL DAYS
5. BEING SO RESTLESS THAT IT IS HARD TO SIT STILL: SEVERAL DAYS
GAD7 TOTAL SCORE: 7

## 2022-07-13 ASSESSMENT — PATIENT HEALTH QUESTIONNAIRE - PHQ9: SUM OF ALL RESPONSES TO PHQ QUESTIONS 1-9: 9
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
## 2022-07-13 NOTE — TELEPHONE ENCOUNTER
Hi Dr Mittal,  Per review we are unable to do testing for this patient. We do not do Autism and ADHD testing. Please refer [facility] to [facility], or another establishment.   We notified the patient that we are unable to do the testing for him.       Thank you Dr Mittal

## 2022-07-13 NOTE — PATIENT INSTRUCTIONS
Continue Adderall XR 10 mg p.o. each morning.  Continue Paxil 40 mg p.o. daily.   Continue trazodone  mg p.o. at bedtime if needed for sleep. If experiencing daytime sleepiness or difficulty waking up in the morning, please REDUCE trazodone dose.    Continue therapy and plans for DBT (DBT scheduled for September)  Follow up with me in 3 months. Sooner if new or worsening symptoms.   Once stable, plan is to return to primary care provider for ongoing psychiatric medication management.

## 2022-07-15 NOTE — PROGRESS NOTES
[facility] Post-Appointment Chart -check      Correct pharmacy verified with patient and updated in chart? [x] yes []no    Medications ordered this visit were e-scribed.  Verified by order class [x] yes  [] no    List Medications: PARoxetine (PAXIL) 40 MG tablet; traZODone (DESYREL) 50 MG tablet  Post dated scripts: Adderall XR 10 mg 8/03/2022; 09/03/2022; 10/01/2022    Medication changes or discontinuations were communicated to patient's pharmacy: [] yes  [x] no    UA collected [] yes  [] no  [x] n/a-virtual     Outside referrals / labs, etc support staff to follow up: [] yes  [x] no    Future appointment was made: [] yes  [x] no  [] n/a   RTC 3 months  Future Appointments 7/15/2022 - 1/11/2023              Date Visit Type Length Department Provider     7/29/2022 12:40 PM OFFICE VISIT 40 min [facility] Amina Mittal MD    Location Instructions:     We are located at [address] [place] [address]. We offer free parking in our on-site lot. To check in, go to the right upon entering the building. Enter the clinic and proceed to the  straight ahead.                   Dictation completed at time of chart check: [x] yes  [] no    I have checked the documentation for today s encounters and the above information has been reviewed and completed.      Marie Rivers on July 15, 2022 at 4:05 PM

## 2022-07-28 ENCOUNTER — MYC MEDICAL ADVICE (OUTPATIENT)
Dept: FAMILY MEDICINE | Facility: CLINIC | Age: 42
End: 2022-07-28

## 2022-07-29 NOTE — TELEPHONE ENCOUNTER
Outreach to [facility] at [facility].  Candy Adamson, formerly [facility], [phone].    Will await her call back or notes to be faxed with her specific recommendation on what testing patient was to have ordered.

## 2022-08-02 ENCOUNTER — NURSE TRIAGE (OUTPATIENT)
Dept: NURSING | Facility: CLINIC | Age: 42
End: 2022-08-02

## 2022-08-02 NOTE — TELEPHONE ENCOUNTER
Telephone call:     Candy from [facility] [employer] returning a call about neuropsychology referral.   She is requesting: MMPI and WAIS for psychological testing.      Will route to Dr. Mittal and care team.     Angeles Ritter RN   08/02/22 3:19 PM  [facility] Nurse Advisor

## 2022-08-02 NOTE — TELEPHONE ENCOUNTER
His is response to the [facility]ES[facility] message chain between patient and clinic regarding testing that nvite wanted to have done. Sending to PCP

## 2022-08-03 ENCOUNTER — MYC REFILL (OUTPATIENT)
Dept: FAMILY MEDICINE | Facility: CLINIC | Age: 42
End: 2022-08-03

## 2022-08-03 DIAGNOSIS — F90.0 ATTENTION DEFICIT HYPERACTIVITY DISORDER (ADHD), PREDOMINANTLY INATTENTIVE TYPE: ICD-10-CM

## 2022-08-04 RX ORDER — DEXTROAMPHETAMINE SACCHARATE, AMPHETAMINE ASPARTATE MONOHYDRATE, DEXTROAMPHETAMINE SULFATE AND AMPHETAMINE SULFATE 2.5; 2.5; 2.5; 2.5 MG/1; MG/1; MG/1; MG/1
10 CAPSULE, EXTENDED RELEASE ORAL DAILY
Qty: 30 CAPSULE | Refills: 0 | Status: SHIPPED | OUTPATIENT
Start: 2022-08-04 | End: 2022-09-01

## 2022-08-10 ENCOUNTER — TELEPHONE (OUTPATIENT)
Dept: FAMILY MEDICINE | Facility: CLINIC | Age: 42
End: 2022-08-10

## 2022-09-01 ENCOUNTER — MYC REFILL (OUTPATIENT)
Dept: FAMILY MEDICINE | Facility: CLINIC | Age: 42
End: 2022-09-01

## 2022-09-01 DIAGNOSIS — F90.0 ATTENTION DEFICIT HYPERACTIVITY DISORDER (ADHD), PREDOMINANTLY INATTENTIVE TYPE: ICD-10-CM

## 2022-09-02 RX ORDER — DEXTROAMPHETAMINE SACCHARATE, AMPHETAMINE ASPARTATE MONOHYDRATE, DEXTROAMPHETAMINE SULFATE AND AMPHETAMINE SULFATE 2.5; 2.5; 2.5; 2.5 MG/1; MG/1; MG/1; MG/1
10 CAPSULE, EXTENDED RELEASE ORAL DAILY
Qty: 30 CAPSULE | Refills: 0 | Status: SHIPPED | OUTPATIENT
Start: 2022-09-02

## 2022-09-22 NOTE — TELEPHONE ENCOUNTER
Forms Request  Name of form/paperwork: doctor f/u form  Have you been seen for this request: N/A  Do we have the form: yes, in providers inbox  When is form needed by: when done  How would you like the form returned: call to : [phone]  Patient Notified form requests are processed in 3-5 business days: yes    Okay to leave a detailed message? yes         Home

## 2022-10-01 ENCOUNTER — HEALTH MAINTENANCE LETTER (OUTPATIENT)
Age: 42
End: 2022-10-01

## 2022-10-13 ENCOUNTER — IMMUNIZATION (OUTPATIENT)
Dept: FAMILY MEDICINE | Facility: CLINIC | Age: 42
End: 2022-10-13
Payer: COMMERCIAL

## 2022-10-13 DIAGNOSIS — Z23 ENCOUNTER FOR IMMUNIZATION: Primary | ICD-10-CM

## 2022-10-13 PROCEDURE — 90471 IMMUNIZATION ADMIN: CPT

## 2022-10-13 PROCEDURE — 0124A COVID-19,PF,PFIZER BOOSTER BIVALENT: CPT

## 2022-10-13 PROCEDURE — 90686 IIV4 VACC NO PRSV 0.5 ML IM: CPT

## 2022-10-13 PROCEDURE — 91312 COVID-19,PF,PFIZER BOOSTER BIVALENT: CPT

## 2022-10-13 PROCEDURE — 99207 PR NO CHARGE NURSE ONLY: CPT

## 2022-12-12 DIAGNOSIS — F33.41 MAJOR DEPRESSIVE DISORDER, RECURRENT EPISODE, IN PARTIAL REMISSION (H): ICD-10-CM

## 2023-01-14 ENCOUNTER — HOSPITAL ENCOUNTER (EMERGENCY)
Facility: CLINIC | Age: 43
Discharge: HOME | End: 2023-01-14
Attending: EMERGENCY MEDICINE | Admitting: EMERGENCY MEDICINE
Payer: COMMERCIAL

## 2023-01-14 VITALS
DIASTOLIC BLOOD PRESSURE: 79 MMHG | HEIGHT: 68 IN | RESPIRATION RATE: 16 BRPM | TEMPERATURE: 97.8 F | HEART RATE: 77 BPM | OXYGEN SATURATION: 97 % | SYSTOLIC BLOOD PRESSURE: 133 MMHG | WEIGHT: 205 LBS | BODY MASS INDEX: 31.07 KG/M2

## 2023-01-14 DIAGNOSIS — Z11.3 SCREEN FOR SEXUALLY TRANSMITTED DISEASES: ICD-10-CM

## 2023-01-14 PROCEDURE — 87491 CHLMYD TRACH DNA AMP PROBE: CPT | Performed by: FAMILY MEDICINE

## 2023-01-14 PROCEDURE — 99283 EMERGENCY DEPT VISIT LOW MDM: CPT

## 2023-01-14 PROCEDURE — 87591 N.GONORRHOEAE DNA AMP PROB: CPT | Performed by: FAMILY MEDICINE

## 2023-01-14 ASSESSMENT — ACTIVITIES OF DAILY LIVING (ADL): ADLS_ACUITY_SCORE: 33

## 2023-01-15 NOTE — DISCHARGE INSTRUCTIONS
Follow up on gonorrhea and chlamydia urine results, which should be back in next day or 2.  Seek additional evaluation if you develop symptoms including discharge from penis, testicular swelling, blisters/lesions on genitalia.

## 2023-01-15 NOTE — ED PROVIDER NOTES
"  Emergency Department Encounter     Evaluation Date & Time:   1/14/2023  8:41 PM    CHIEF COMPLAINT:  No chief complaint on file.      Triage Note:  Here for STI check  Denies any symptoms  Reports received \"frantic\" call this evening from ex partner who is currently being evaluated for sti at a medical facility and pt reports provider taking care of ex advised pt to go to ER for STI check.               ED COURSE & MEDICAL DECISION MAKING:        Pt here for STI screening as he was called by an ex-partner that he broke up with 3 days ago or so.  Pt reports that she called him while he was at work as she was currently being tested at an urgent care for possible STI. Pt reports she was accusing him, but he has no symptoms now or recently.  No discharge, pain, swelling, lesions including no vesicles/blisters.  Pt denies any acute complaints. Will send off urine gc/chlam testing. Pt instructed on follow up on this, return for any new symptoms/concerns.      9:00 PM I introduced myself to the patient, obtained patient history, performed a physical exam, and discussed plan for ED workup including potential diagnostic laboratory/imaging studies and interventions.    9:13 PM I introduced myself to the patient, obtained patient history, performed a physical exam, and discussed plan for ED workup including potential diagnostic laboratory/imaging studies and interventions.    At the conclusion of the encounter I discussed the results of all the tests and the disposition. The questions were answered. The patient or family acknowledged understanding and was agreeable with the care plan.    Medical Decision Making    History:    Supplemental history from: Documented in HPI, if applicable    External Record(s) reviewed: Documented in HPI, if applicable.    Work Up:    Chart documentation includes differential considered and any EKGs or imaging independently interpreted by provider.    In additional to work up documented, I " considered the following work up: See chart documentation, if applicable.    External consultation:    Discussion of management with another provider: See chart documentation, if applicable    Complicating factors:    Care impacted by chronic illness: Hyperlipidemia    Care affected by social determinants of health: N/A    Disposition considerations: Discharge. No recommendations on prescription strength medication(s). N/A.      MEDICATIONS GIVEN IN THE EMERGENCY DEPARTMENT:  Medications - No data to display    NEW PRESCRIPTIONS STARTED AT TODAY'S ED VISIT:  New Prescriptions    No medications on file       HPI   HPI     Jack Logan is a 42 year old male with a pertinent history of anxiety disorder, hyperlipidemia, asthma, bipolar disorder, alcoholic fatty liver, Asperger's disorder, obesity, and depression who presents to this ED by walk in for evaluation of STI test.    About one hour ago, patient received a frantic call at work from his ex-girlfriend, who recommended he get checked for a STD because of her current symptoms (which are unknown to the patient). Patient's ex-girlfriend was seen at Urgent Care, and her provider spoke with the patient on the phone, and he also highly suggested he get tested for an STD as soon as possible. Patient was quite anxious, so [facility].    Patient was together with his ex-girlfriend for 3.5 months. His last sexual encounter was 1.5 weeks ago, and they broke up 3 days ago. She was his only partner during that time, and he believes that he was her only partner during that time, as well.    He has otherwise been in his normal state of health. He denies a history of pain, rash, or lesions in his genitourinary area. He denies current dysuria.    REVIEW OF SYSTEMS:  Review of Systems  See HPI documentation.    Medical History     Past Medical History:   Diagnosis Date     ADHD (attention deficit  hyperactivity disorder)      Allergic rhinitis, cause unspecified      Allergic rhinitis, cause unspecified      Anxiety      Anxiety      Asthma      Asthma      Autism spectrum disorder      Cystic fibrosis carrier      Cystic fibrosis carrier      Depression      Depression      Depressive disorder      Gastroesophageal reflux disease      Hearing loss      Hearing loss      Liver disease      Motion sickness      Obesity      Obesity      Onychomycosis      Onychomycosis      Sleep apnea        Past Surgical History:   Procedure Laterality Date     ABDOMEN SURGERY      Appendix     APPENDECTOMY       BIOPSY      Skin tissue for melenoma test     EXCISE TOENAIL(S) Right 12/20/2021    Procedure: Matrixectomy digits one, four and five right foot;  Surgeon: Yung Merchant DPM;  Location: [facility]     OTHER SURGICAL HISTORY      atypical nevus     WISDOM TOOTH EXTRACTION         Family History   Problem Relation Age of Onset     Melanoma Mother      Autism Spectrum Disorder Mother      Depression Mother      Anxiety Disorder Mother      Asthma Mother      Bipolar Disorder Mother      Chronic Obstructive Pulmonary Disease Mother      Bipolar Disorder Father      Cancer Father 52        nasopharyngeal     Diabetes Father      Hypertension Father      Hyperlipidemia Father      Depression Father      Anxiety Disorder Father      Obesity Father      Pancreatic Cancer Father      Pancreatic Cancer Maternal Grandmother      Parkinsonism Maternal Grandmother      Thrombocytopenia Maternal Grandmother      Unknown/Adopted Maternal Grandfather      Glaucoma Paternal Grandmother      Pancreatic Cancer Paternal Grandmother         diagnosed in her 60s-70s     Melanoma Paternal Grandmother         diagnosed in her 60s-70s     Pancreatic Cancer Paternal Grandfather         possible diagnosis     Diabetes Paternal Grandfather      Hypertension Paternal Grandfather      Hyperlipidemia Paternal Grandfather       "Cystic Fibrosis Daughter      Pancreatic Cancer Paternal Aunt         diagnosed in her 50s-60s     Cancer Paternal Uncle         nasopharyngeal cancer, diagnosed in his 50s-60s     Colon Cancer Cousin         paternal first-cousin, prior to age 50     Leukemia Cousin         paternal first-cousin, in childhood     Pancreatic Cancer Other      Stomach Cancer Other         great-aunt (paternal grandmother's sister)       Social History     Tobacco Use     Smoking status: Never     Smokeless tobacco: Never   Substance Use Topics     Alcohol use: Yes     Alcohol/week: 2.5 standard drinks     Comment: Alcoholic Drinks/day: Social      Drug use: No       albuterol (PROAIR HFA;PROVENTIL HFA;VENTOLIN HFA) 90 mcg/actuation inhaler  amphetamine-dextroamphetamine (ADDERALL XR) 10 MG 24 hr capsule  amphetamine-dextroamphetamine (ADDERALL XR) 10 MG 24 hr capsule  cetirizine (ZYRTEC) 10 MG tablet  fluticasone (FLONASE) 50 mcg/actuation nasal spray  PARoxetine (PAXIL) 40 MG tablet  PARoxetine (PAXIL) 40 MG tablet  traZODone (DESYREL) 50 MG tablet  traZODone (DESYREL) 50 MG tablet        Physical Exam     Vitals:  /79   Pulse 77   Temp 97.8  F (36.6  C) (Oral)   Resp 16   Ht 1.727 m (5' 8\")   Wt 93 kg (205 lb)   SpO2 97%   BMI 31.17 kg/m      PHYSICAL EXAM:   Physical Exam  Vitals and nursing note reviewed.   Constitutional:       General: He is not in acute distress.     Appearance: Normal appearance.   HENT:      Head: Normocephalic and atraumatic.      Nose: Nose normal.      Mouth/Throat:      Mouth: Mucous membranes are moist.   Eyes:      Extraocular Movements: Extraocular movements intact.   Cardiovascular:      Rate and Rhythm: Normal rate and regular rhythm.      Pulses: Normal pulses.           Radial pulses are 2+ on the right side and 2+ on the left side.        Dorsalis pedis pulses are 2+ on the right side and 2+ on the left side.   Pulmonary:      Effort: Pulmonary effort is normal.   Genitourinary:     " Comments: Exam deferred as no symptoms  Skin:     Findings: No rash.   Neurological:      General: No focal deficit present.      Mental Status: He is alert. Mental status is at baseline.      Comments: Fluent speech   Psychiatric:         Mood and Affect: Mood is anxious.         Speech: Speech is rapid and pressured.         Behavior: Behavior normal.         Results     LAB:  All pertinent labs reviewed and interpreted  Labs Ordered and Resulted from Time of ED Arrival to Time of ED Departure - No data to display    RADIOLOGY:  No orders to display                ECG:  none    PROCEDURES:  Procedures:  none      FINAL IMPRESSION:    ICD-10-CM    1. Screen for sexually transmitted diseases  Z11.3           0 minutes of critical care time      I, Brooklynn Riley, am serving as a scribe to document services personally performed by Dr. Bret Rosa, based on my observations and the provider's statements to me. I, Bret Rosa, DO attest that Brooklynn Riley is acting in a scribe capacity, has observed my performance of the services and has documented them in accordance with my direction.      Bret Rosa DO  Emergency Medicine  [facility] EMERGENCY ROOM  1/14/2023  8:59 PM        Bret Rosa MD  01/14/23 4339

## 2023-01-15 NOTE — ED TRIAGE NOTES
"Here for STI check  Denies any symptoms  Reports received \"frantic\" call this evening from ex partner who is currently being evaluated for sti at a medical facility and pt reports provider taking care of ex advised pt to go to ER for STI check.     Triage Assessment     Row Name 01/14/23 2036       Triage Assessment (Adult)    Airway WDL WDL       Respiratory WDL    Respiratory WDL WDL       Skin Circulation/Temperature WDL    Skin Circulation/Temperature WDL WDL       Cardiac WDL    Cardiac WDL WDL       Peripheral/Neurovascular WDL    Peripheral Neurovascular WDL WDL       Cognitive/Neuro/Behavioral WDL    Cognitive/Neuro/Behavioral WDL WDL              "

## 2023-01-16 LAB
C TRACH DNA SPEC QL NAA+PROBE: NEGATIVE
N GONORRHOEA DNA SPEC QL NAA+PROBE: NEGATIVE

## 2023-03-12 RX ORDER — PAROXETINE 40 MG/1
TABLET, FILM COATED ORAL
Qty: 30 TABLET | Refills: 2 | OUTPATIENT
Start: 2023-03-12

## 2023-04-10 DIAGNOSIS — F33.41 MAJOR DEPRESSIVE DISORDER, RECURRENT EPISODE, IN PARTIAL REMISSION (H): ICD-10-CM

## 2023-04-11 RX ORDER — PAROXETINE 40 MG/1
TABLET, FILM COATED ORAL
Qty: 30 TABLET | Refills: 2 | OUTPATIENT
Start: 2023-04-11

## 2023-04-11 NOTE — TELEPHONE ENCOUNTER
Refill request for Paxil 40 mg. Patient no longer under providers care. Last filled on 7/13/22for #30 tabs and 2 refills. Last visit 7/13/22. Will refuse and route to pharmacy.     Rosie Garland RN on 4/11/2023 at 12:07 PM

## 2023-05-14 ENCOUNTER — HEALTH MAINTENANCE LETTER (OUTPATIENT)
Age: 43
End: 2023-05-14

## 2023-08-04 ENCOUNTER — APPOINTMENT (OUTPATIENT)
Dept: RADIOLOGY | Facility: CLINIC | Age: 43
End: 2023-08-04
Attending: EMERGENCY MEDICINE
Payer: COMMERCIAL

## 2023-08-04 ENCOUNTER — NURSE TRIAGE (OUTPATIENT)
Dept: NURSING | Facility: CLINIC | Age: 43
End: 2023-08-04
Payer: COMMERCIAL

## 2023-08-04 ENCOUNTER — HOSPITAL ENCOUNTER (EMERGENCY)
Facility: CLINIC | Age: 43
Discharge: HOME | End: 2023-08-04
Attending: EMERGENCY MEDICINE | Admitting: EMERGENCY MEDICINE
Payer: COMMERCIAL

## 2023-08-04 VITALS
TEMPERATURE: 97 F | RESPIRATION RATE: 18 BRPM | SYSTOLIC BLOOD PRESSURE: 143 MMHG | BODY MASS INDEX: 33.33 KG/M2 | DIASTOLIC BLOOD PRESSURE: 86 MMHG | HEART RATE: 70 BPM | WEIGHT: 225 LBS | OXYGEN SATURATION: 98 % | HEIGHT: 69 IN

## 2023-08-04 DIAGNOSIS — R05.1 ACUTE COUGH: ICD-10-CM

## 2023-08-04 LAB
ATRIAL RATE - MUSE: 68 BPM
DIASTOLIC BLOOD PRESSURE - MUSE: 75 MMHG
INTERPRETATION ECG - MUSE: NORMAL
P AXIS - MUSE: 57 DEGREES
PR INTERVAL - MUSE: 152 MS
QRS DURATION - MUSE: 96 MS
QT - MUSE: 394 MS
QTC - MUSE: 418 MS
R AXIS - MUSE: 50 DEGREES
SYSTOLIC BLOOD PRESSURE - MUSE: 117 MMHG
T AXIS - MUSE: 56 DEGREES
VENTRICULAR RATE- MUSE: 68 BPM

## 2023-08-04 PROCEDURE — 71046 X-RAY EXAM CHEST 2 VIEWS: CPT

## 2023-08-04 PROCEDURE — 99284 EMERGENCY DEPT VISIT MOD MDM: CPT | Mod: 25

## 2023-08-04 PROCEDURE — 93005 ELECTROCARDIOGRAM TRACING: CPT | Performed by: EMERGENCY MEDICINE

## 2023-08-04 RX ORDER — IPRATROPIUM BROMIDE AND ALBUTEROL SULFATE 2.5; .5 MG/3ML; MG/3ML
3 SOLUTION RESPIRATORY (INHALATION) ONCE
Status: DISCONTINUED | OUTPATIENT
Start: 2023-08-04 | End: 2023-08-04 | Stop reason: HOSPADM

## 2023-08-04 RX ORDER — ALBUTEROL SULFATE 90 UG/1
2 AEROSOL, METERED RESPIRATORY (INHALATION) EVERY 6 HOURS PRN
Qty: 18 G | Refills: 0 | Status: SHIPPED | OUTPATIENT
Start: 2023-08-04

## 2023-08-04 ASSESSMENT — ACTIVITIES OF DAILY LIVING (ADL): ADLS_ACUITY_SCORE: 35

## 2023-08-04 NOTE — ED TRIAGE NOTES
The patient presents to the ED with complaints of a cough for the past 3 days and a coughing attack that occurred 51 minutes prior to arrival that caused him to develop chest, back and head pain. He denies fever. Phone triage instructed him to be evaluated in the ED for pneumonia. The patient reports cough is productive of yellow/white sputum.

## 2023-08-04 NOTE — ED PROVIDER NOTES
EMERGENCY DEPARTMENT ENCOUNTER            IMPRESSION:  Bronchitis        MEDICAL DECISION MAKING:  It was my pleasure to provide care for Jack Logan who presented for evaluation of cough    On my exam patient is pleasant and cooperative.   Vital signs are normal.  Physical exam notable for occasional cough with no wheezing.     Albuterol nebulizer administered for symptom relief.  Patient's symptoms improved.     EKG shows no ischemia or arrhythmia    Chest x-ray imaging does not show pneumonia.  Imaging independently interpreted and agree with radiologist findings of no pneumonia    ED evaluation is consistent with bronchitis.  PERC negative for PE.    Patient was reevaluated and results were discussed.  I recommended dual of MDI      Prior to making a final disposition on this patient the results of patient's tests and other diagnostic studies were discussed with the patient. All questions were answered. Patient expressed understanding of the plan and was amenable.    Return precautions and follow-up were discussed.     =================================================================  CHIEF COMPLAINT:  Chief Complaint   Patient presents with    Cough         HPI  Jack Logan is a 42 year old male with a history of asthma, obstructive sleep apnea, hyperlipidemia, anxiety disorder, bipolar disorder, and Asperger's disorder who presents to the ED by walk in for evaluation of a cough.    Patient reports that he developed a cough, shortness of breath, chest pressure, and wheezing 3 days ago. He reports that his symptoms are progressively worsening. He describes his chest pressure as the feeling of someone sitting on his chest and says that it makes it harder for him to take a deep breath which is making him feel short of breath. Patient reports that at 6:15 AM (~1 hour ago) he had a coughing fit that lasted about 2-3 minutes. He says that he also has some tenderness to his chest due to this coughing fit. He  notes that he took a rapid COVID test at home that returned negative. Patient endorses using his albuterol inhaler with slight relief of his symptoms. Patient denies any known sick contacts. Patient denies fevers, chills, rhinorrhea, abdominal pain, diarrhea, dysuria, hematuria, or any other concerns at this time.    REVIEW OF SYSTEMS  Constitutional: Does not report chills, unintentional weight loss or fatigue   Eyes: Does not report visual changes or discharge    HENT: Does not report sore throat, ear pain or neck pain  Respiratory: Endorses cough, shortness of breath, and wheezing.   Cardiovascular: Endorses chest pressure and chest pain (chest pain secondary to coughing fit). Does not report palpitations or leg swelling  GI: Does not report abdominal pain, nausea, vomiting, or dark, bloody stools.    : Does not report hematuria, dysuria, or flank pain  Musculoskeletal: Does not report any new musculoskeletal pain or new muscle/joint pains  Skin: Does not report rash or wound  Neurologic: Does not report current headache, new weakness, focal weakness, or sensory changes        Remainder of systems reviewed, unless noted in HPI all others negative.      PAST MEDICAL HISTORY:  Past Medical History:   Diagnosis Date    ADHD (attention deficit hyperactivity disorder)     Allergic rhinitis, cause unspecified     Allergic rhinitis, cause unspecified     Anxiety     Anxiety     Asthma     Asthma     Autism spectrum disorder     Cystic fibrosis carrier     Cystic fibrosis carrier     Depression     Depression     Depressive disorder     For as long as I can remember    Gastroesophageal reflux disease     Hearing loss     Hearing loss     Liver disease     Motion sickness     Obesity     Obesity     Onychomycosis     Onychomycosis     Sleep apnea        PAST SURGICAL HISTORY:  Past Surgical History:   Procedure Laterality Date    ABDOMEN SURGERY      Appendix    APPENDECTOMY      BIOPSY      Skin tissue for melenoma  test    EXCISE TOENAIL(S) Right 12/20/2021    Procedure: Matrixectomy digits one, four and five right foot;  Surgeon: Yung Merchant DPM;  Location: [facility]    OTHER SURGICAL HISTORY      atypical nevus    WISDOM TOOTH EXTRACTION           CURRENT MEDICATIONS:    albuterol (PROAIR HFA;PROVENTIL HFA;VENTOLIN HFA) 90 mcg/actuation inhaler  amphetamine-dextroamphetamine (ADDERALL XR) 10 MG 24 hr capsule  amphetamine-dextroamphetamine (ADDERALL XR) 10 MG 24 hr capsule  cetirizine (ZYRTEC) 10 MG tablet  fluticasone (FLONASE) 50 mcg/actuation nasal spray  PARoxetine (PAXIL) 40 MG tablet  PARoxetine (PAXIL) 40 MG tablet  traZODone (DESYREL) 50 MG tablet  traZODone (DESYREL) 50 MG tablet        ALLERGIES:  Allergies   Allergen Reactions    Cats Hives and Shortness Of Breath    Dust Mite Extract Cough and Itching    Pollen Extract Rash       FAMILY HISTORY:  Family History   Problem Relation Age of Onset    Melanoma Mother     Autism Spectrum Disorder Mother     Depression Mother     Anxiety Disorder Mother     Asthma Mother     Bipolar Disorder Mother     Chronic Obstructive Pulmonary Disease Mother     Bipolar Disorder Father     Cancer Father 52        nasopharyngeal    Diabetes Father     Hypertension Father     Hyperlipidemia Father     Depression Father     Anxiety Disorder Father     Obesity Father     Pancreatic Cancer Father     Pancreatic Cancer Maternal Grandmother     Parkinsonism Maternal Grandmother     Thrombocytopenia Maternal Grandmother     Unknown/Adopted Maternal Grandfather     Glaucoma Paternal Grandmother     Pancreatic Cancer Paternal Grandmother         diagnosed in her 60s-70s    Melanoma Paternal Grandmother         diagnosed in her 60s-70s    Pancreatic Cancer Paternal Grandfather         possible diagnosis    Diabetes Paternal Grandfather     Hypertension Paternal Grandfather     Hyperlipidemia Paternal Grandfather     Cystic Fibrosis Daughter     Pancreatic Cancer Paternal Aunt  "        diagnosed in her 50s-60s    Cancer Paternal Uncle         nasopharyngeal cancer, diagnosed in his 50s-60s    Colon Cancer Cousin         paternal first-cousin, prior to age 50    Leukemia Cousin         paternal first-cousin, in childhood    Pancreatic Cancer Other     Stomach Cancer Other         great-aunt (paternal grandmother's sister)       SOCIAL HISTORY:   Social History     Socioeconomic History    Marital status: Legally     Number of children: 1    Highest education level: Associate degree: occupational, technical, or vocational program   Tobacco Use    Smoking status: Never    Smokeless tobacco: Never   Substance and Sexual Activity    Alcohol use: Yes     Alcohol/week: 2.5 standard drinks of alcohol     Comment: Alcoholic Drinks/day: Social     Drug use: No    Sexual activity: Yes     Partners: Female   Other Topics Concern    Parent/sibling w/ CABG, MI or angioplasty before 65F 55M? No       PHYSICAL EXAM:    BP (!) 143/86   Pulse 70   Temp 97  F (36.1  C) (Temporal)   Resp 18   Ht 1.753 m (5' 9\")   Wt 102.1 kg (225 lb)   SpO2 98%   BMI 33.23 kg/m      Constitutional: Awake, alert, no distress  Head: Normocephalic, atraumatic.  ENT: Mucous membranes are moist.  No pallor.   Eyes: Pupils are reactive.  No discoloration.  Neck: No lymphadenopathy, no stridor, supple, no soft tissue swelling  Chest: No tenderness   Respiratory: Respirations even, unlabored. Lungs clear to ascultation bilaterally, in no acute respiratory distress.  Cardiovascular: Regular rate and rhythm.  Good overall perfusion.  Upper and lower extremity pulses are equal.  GI: Abdomen soft, non-tender to palpation.  No guarding or rebound. Bowel sounds present throughout.   Back: No CVA tenderness.    Musculoskeletal: Moves all 4 extremities equally, full function and capacity no peripheral edema.   Integument: Warm, dry. No rash. No bruising or petechiae.  Neurologic: Alert & oriented x 3. Normal speech. "   Psychiatric: Normal mood and affect.  Appropriate judgement.    ED COURSE:  7:32 AM I performed my initial interview and exam.  9:07 AM I rechecked and updated the patient prior to discharge.      Medical Decision Making    History:  Supplemental history from: N/A  External Record(s) reviewed: External medical records including care everywhere reviewed. Reviewed Nurse Triage note (8/4/23).    Work Up:  EKG and imaging studies as ordered were independently interpreted by myself.   Broad differential diagnosis considered for respiratory symptom  The patient's presentation was of moderate complexity.     External consultation:      Complicating factors:  Patient has a complicated past medical history including asthma, obstructive sleep apnea, hyperlipidemia, anxiety disorder, bipolar disorder, and Asperger's disorder.  Care affected by social determinants of health: Access to primary care    Disposition involved shared decision-making with the patient.  The patient's management necessitated high risk regarding consideration for hospitalization     LAB:  Laboratory results were independently reviewed and interpreted  Results for orders placed or performed during the hospital encounter of 08/04/23   XR Chest 2 Views    Impression    IMPRESSION:     The cardiac silhouette is normal in size. Hilar and mediastinal interfaces are normal.    The lungs are symmetrically inflated. There are no airspace or interstitial opacities.    Diaphragm curvature is normal. No pleural fluid is present.    Flowing degenerative osteophytes of the mid and lower thoracic spine. Disc spaces are preserved..            RADIOLOGY:  Radiology reports were independently reviewed and interpreted  XR Chest 2 Views   Final Result   IMPRESSION:       The cardiac silhouette is normal in size. Hilar and mediastinal interfaces are normal.      The lungs are symmetrically inflated. There are no airspace or interstitial opacities.      Diaphragm curvature  is normal. No pleural fluid is present.      Flowing degenerative osteophytes of the mid and lower thoracic spine. Disc spaces are preserved..               EKG:    Time: 04-Aug-2023 08:08:13  Rate: 68 bpm  Rhythm: Sinus rhythm  WV Interval: 152 ms  QRS Interval: 96 ms  QTc Interval: 418 ms    Impression: Sinus rhythm.    Comparison: When compared with ECG of 29-Jul-2019 08:57, No significant change was found.    I have independently reviewed and interpreted the EKG(s) documented above.        MEDICATIONS GIVEN IN THE EMERGENCY:  Medications   ipratropium - albuterol 0.5 mg/2.5 mg/3 mL (DUONEB) neb solution 3 mL (has no administration in time range)           NEW PRESCRIPTIONS STARTED AT TODAY'S ER VISIT:  New Prescriptions    No medications on file                FINAL DIAGNOSIS:    ICD-10-CM    1. Acute cough  R05.1                  NAME: Jack Logan  AGE: 42 year old male  YOB: 1980  MRN: [id number]  EVALUATION DATE & TIME: No admission date for patient encounter.    PCP: Amina Mittal    ED PROVIDER: PAULINO Ruvalcaba Latrell Montour, am serving as a scribe to document services personally performed by Dr. Marshall Paris based on my observation and the provider's statements to me. IMarshall MD attest that Jose Guadalupe is acting in a scribe capacity, has observed my performance of the services and has documented them in accordance with my direction.    Marshall Paris M.D.  Emergency Medicine  [facility] EMERGENCY ROOM  [address] [address]  [phone]  Dept: [phone]  8/4/2023         Marshall Paris MD  08/04/23 1220

## 2023-08-04 NOTE — TELEPHONE ENCOUNTER
"Nurse Triage SBAR    Is this a 2nd Level Triage? NO    Situation: Patient woke up with a coughing fit 40 min ago and is experiencing wheezing and chest pain.   Consent: not needed    Background: has seasonal allergies- asthma triggered takes OTC medication- taking albuterol inhaler (over the last 3 days) - home COVID negative    Assessment:   40 min ago woke up with a coughing fit- did not vomit  Indicated made his head and back hurt   Indicates my chest is \"killing me\"- states feels sore- indicated it feels like someone is \"sitting on his chest\"and endorses that this is present now  light yellow white sputum  Persistent 3 days now   Indicates thought it was the smoke in the air but states that this doesn't seem to be the problem  Indicates trouble catching his breath during the cough- indicates expiratory wheezing  Indicated that he is also feeling chest congestion/hearing crackles in his chest   Indicating taking a deep breath requires more effort  While speaking to patient it was observed that he is able to speak in full sentences     Protocol Recommended Disposition:       Recommendation: Advised patient to be seen in the ER. Reviewed additional care advice with patient and patient agreeable - will go to [facility]    ER    Does the patient meet one of the following criteria for ADS visit consideration? 16+ years old, with an [facility] PCP       Viviana Wang RN 6:45 AM 8/4/2023  Reason for Disposition   Chest pain  (Exception: MILD central chest pain, present only when coughing)    Additional Information   Negative: SEVERE difficulty breathing (e.g., struggling for each breath, speaks in single words)   Negative: Bluish (or gray) lips or face now   Negative: [1] Difficulty breathing AND [2] exposure to flames, smoke, or fumes   Negative: [1] Stridor AND [2] difficulty breathing   Negative: Sounds like a life-threatening emergency to the triager   Negative: [1] Previous asthma attacks AND [2] this feels like " asthma attack   Negative: Dry cough (non-productive;  no sputum or minimal clear sputum)   Negative: [1] MODERATE difficulty breathing (e.g., speaks in phrases, SOB even at rest, pulse 100-120) AND [2] still present when not coughing    Protocols used: Cough - Acute Nmaqenxzsz-O-SG

## 2023-08-25 ENCOUNTER — TRANSCRIBE ORDERS (OUTPATIENT)
Dept: OTHER | Age: 43
End: 2023-08-25

## 2023-08-25 DIAGNOSIS — M54.50 LOW BACK PAIN: Primary | ICD-10-CM

## 2023-09-06 ENCOUNTER — OFFICE VISIT (OUTPATIENT)
Dept: NEUROLOGY | Facility: CLINIC | Age: 43
End: 2023-09-06
Payer: COMMERCIAL

## 2023-09-06 DIAGNOSIS — M79.662 PAIN OF LEFT LOWER LEG: Primary | ICD-10-CM

## 2023-09-06 PROCEDURE — 95910 NRV CNDJ TEST 7-8 STUDIES: CPT | Performed by: PSYCHIATRY & NEUROLOGY

## 2023-09-06 PROCEDURE — 95886 MUSC TEST DONE W/N TEST COMP: CPT | Mod: RT | Performed by: PSYCHIATRY & NEUROLOGY

## 2023-09-06 NOTE — PROCEDURES
[facility]     Formerly [facility]  [address], [address]  [place] [address]  Tel: [phone]  Fax: [phone]          Full Name: Catrachito Logan Gender: Male  Patient ID: [id number] YOB: 1980      Visit Date: 9/6/2023 12:07  Age: 42 Years 10 Months Old  Interpreted By: Ayush Mackey MD   Ref Dr.: Ernestine Sheffield MD  Tech: ST   Height: 5 feet 8 inch  Reason for referral: Evaluate bilateral lowers. c/o pain in low back/legs > 10 years. Left > Right.       Motor NCS      Nerve / Sites Lat Amp Dist Carmelo    ms mV cm m/s   L Peroneal - EDB      Ankle 4.11 2.5 8       Fib head 10.68 2.3 31 47      Pop fossa 13.07 1.9 11 46   R Peroneal - EDB      Ankle 4.27 12.4 8       Fib head 10.78 12.3 31 48      Pop fossa 13.13 11.1 11 47   L Tibial - AH      Ankle 4.06 10.9 8       Pop fossa 11.82 10.1 39 50   R Tibial - AH      Ankle 4.17 9.5 8       Pop fossa 12.92 9.4 40 46       F  Wave      Nerve Fmin    ms   L Tibial - AH 51.93   R Tibial - AH 51.61       Sensory NCS      Nerve / Sites Onset Lat Pk Lat Amp.2-3 Dist Carmelo    ms ms  V cm m/s   L Sural - Ankle (Calf)      Calf 2.55 3.18 20.8 14 55   R Sural - Ankle (Calf)      Calf 2.71 3.44 12.5 14 52   L Superficial peroneal - Ankle      Lat leg 2.50 3.39 17.3 12 48   R Superficial peroneal - Ankle      Lat leg 2.55 3.33 19.1 12 47       EMG Summary Table     Spontaneous MUAP Rcmt Note   Muscle Fib PSW Fasc IA # Amp Dur PPP Rate Type   L. Gluteus medius None None None N N N N N N N   L. Gluteus ronak None None None N N N N N N N   L. L3 paraspinal None None None N N N N N N N   L. L4 paraspinal None None None N N N N N N N   L. L5 paraspinal None None None N N N N N N N   L. S1 paraspinal None None None N N N N N N N   L. Adductor dot None None None N N N N N N N   L. Quadriceps None None None N N N N N N N   L. Tibialis anterior None None None N N N N N N N   L. Gastrocnemius (Medial head)  None None None N N N N N N N   L. Tibialis posterior None None None N N N N N N N   R. Adductor dot None None None N N N N N N N   R. Quadriceps None None None N N N N N N N   R. Tibialis anterior None None None N N N N N N N   R. Gastrocnemius (Medial head) None None None N N N N N N N   R. Tibialis posterior None None None N N N N N N N     SUMMARY  Nerve conduction and EMG study of bilateral lower extremities shows:    Normal right peroneal distal motor latency, amplitude and conduction velocity.  Normal left peroneal distal motor latency with low normal amplitude and normal conduction velocity.  Normal right tibial distal motor latency, amplitude and conduction velocity.  Normal left tibial distal motor latency, amplitude and conduction velocity.  Normal bilateral sural and superficial peroneal sensory SNAP.  Monopolar needle exam is normal.      CLINICAL INTERPRETATION:  This is an almost normal nerve conduction and EMG study.  The low normal amplitude of the left peroneal nerve is of unclear significance and could represent a left peroneal neuropathy or could be artifactual.  Further clinical correlation is needed.     Ayush Mackey MD  Neurologist  [facility]  Tel:- [phone]

## 2023-09-14 ENCOUNTER — DOCUMENTATION ONLY (OUTPATIENT)
Dept: PSYCHIATRY | Facility: CLINIC | Age: 43
End: 2023-09-14

## 2023-09-14 NOTE — PROGRESS NOTES
[facility] ([facility]) Dismissal Note    Letters regarding my upcoming resignation on 9/15/23 were sent out with options for transferring psychiatric care.  No response has been received.  Therefore, patient is formally dismissed from [facility] ([facility]) at this time.       If patient has not already done so, they should return to their primary care provider for ongoing management of any psychotropic medications.      Primary care provider may place a new referral for psychiatric services if needed.       Chelo Houser, HEATH CNP on 9/14/2023 at 5:30 PM     CC:  No Ref-Primary, Physician

## 2023-11-12 ENCOUNTER — HOSPITAL ENCOUNTER (EMERGENCY)
Facility: CLINIC | Age: 43
Discharge: HOME | End: 2023-11-12
Attending: FAMILY MEDICINE | Admitting: FAMILY MEDICINE
Payer: COMMERCIAL

## 2023-11-12 VITALS
WEIGHT: 230 LBS | SYSTOLIC BLOOD PRESSURE: 157 MMHG | RESPIRATION RATE: 18 BRPM | HEIGHT: 69 IN | TEMPERATURE: 98.9 F | DIASTOLIC BLOOD PRESSURE: 97 MMHG | OXYGEN SATURATION: 97 % | BODY MASS INDEX: 34.07 KG/M2 | HEART RATE: 88 BPM

## 2023-11-12 DIAGNOSIS — M10.171 ACUTE LEAD-INDUCED GOUT INVOLVING TOE OF RIGHT FOOT, INITIAL ENCOUNTER: ICD-10-CM

## 2023-11-12 DIAGNOSIS — T56.0X1A ACUTE LEAD-INDUCED GOUT INVOLVING TOE OF RIGHT FOOT, INITIAL ENCOUNTER: ICD-10-CM

## 2023-11-12 LAB
ANION GAP SERPL CALCULATED.3IONS-SCNC: 13 MMOL/L (ref 7–15)
BUN SERPL-MCNC: 10.1 MG/DL (ref 6–20)
CALCIUM SERPL-MCNC: 9.6 MG/DL (ref 8.6–10)
CHLORIDE SERPL-SCNC: 103 MMOL/L (ref 98–107)
CREAT SERPL-MCNC: 0.87 MG/DL (ref 0.67–1.17)
DEPRECATED HCO3 PLAS-SCNC: 24 MMOL/L (ref 22–29)
EGFRCR SERPLBLD CKD-EPI 2021: >90 ML/MIN/1.73M2
GLUCOSE SERPL-MCNC: 72 MG/DL (ref 70–99)
POTASSIUM SERPL-SCNC: 4.6 MMOL/L (ref 3.4–5.3)
SODIUM SERPL-SCNC: 140 MMOL/L (ref 135–145)
URATE SERPL-MCNC: 6 MG/DL (ref 3.4–7)

## 2023-11-12 PROCEDURE — 96374 THER/PROPH/DIAG INJ IV PUSH: CPT

## 2023-11-12 PROCEDURE — 84550 ASSAY OF BLOOD/URIC ACID: CPT | Performed by: FAMILY MEDICINE

## 2023-11-12 PROCEDURE — 99284 EMERGENCY DEPT VISIT MOD MDM: CPT | Mod: 25

## 2023-11-12 PROCEDURE — 36415 COLL VENOUS BLD VENIPUNCTURE: CPT | Performed by: FAMILY MEDICINE

## 2023-11-12 PROCEDURE — 80048 BASIC METABOLIC PNL TOTAL CA: CPT | Performed by: FAMILY MEDICINE

## 2023-11-12 PROCEDURE — 250N000011 HC RX IP 250 OP 636: Mod: JZ | Performed by: FAMILY MEDICINE

## 2023-11-12 RX ORDER — PREDNISONE 10 MG/1
TABLET ORAL
Qty: 22 TABLET | Refills: 0 | Status: SHIPPED | OUTPATIENT
Start: 2023-11-12 | End: 2023-11-12

## 2023-11-12 RX ORDER — PANTOPRAZOLE SODIUM 40 MG/1
40 TABLET, DELAYED RELEASE ORAL DAILY
Qty: 14 TABLET | Refills: 0 | Status: SHIPPED | OUTPATIENT
Start: 2023-11-12 | End: 2023-12-12

## 2023-11-12 RX ORDER — KETOROLAC TROMETHAMINE 15 MG/ML
15 INJECTION, SOLUTION INTRAMUSCULAR; INTRAVENOUS ONCE
Status: COMPLETED | OUTPATIENT
Start: 2023-11-12 | End: 2023-11-12

## 2023-11-12 RX ORDER — KETOROLAC TROMETHAMINE 10 MG/1
10 TABLET, FILM COATED ORAL EVERY 6 HOURS PRN
Qty: 10 TABLET | Refills: 0 | Status: SHIPPED | OUTPATIENT
Start: 2023-11-12

## 2023-11-12 RX ORDER — PREDNISONE 10 MG/1
TABLET ORAL
Qty: 22 TABLET | Refills: 0 | Status: SHIPPED | OUTPATIENT
Start: 2023-11-12 | End: 2023-11-23

## 2023-11-12 RX ADMIN — KETOROLAC TROMETHAMINE 15 MG: 15 INJECTION, SOLUTION INTRAMUSCULAR; INTRAVENOUS at 15:20

## 2023-11-12 ASSESSMENT — ACTIVITIES OF DAILY LIVING (ADL): ADLS_ACUITY_SCORE: 35

## 2023-11-12 NOTE — ED TRIAGE NOTES
Great Rt toe pain and swelling past 24 hours.  Similar episode that resolved a week ago. 8/10 pain.

## 2023-11-12 NOTE — ED PROVIDER NOTES
EMERGENCY DEPARTMENT ENCOUNTER      NAME: Jack Logan  AGE: 43 year old male  YOB: 1980  MRN: [id number]  EVALUATION DATE & TIME: 11/12/2023  1:58 PM    PCP: No Ref-Primary, Physician    ED PROVIDER: Marshall Madrid M.D.    Chief Complaint   Patient presents with    Toe Pain     FINAL IMPRESSION:  1. 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 (See chart for details)  2:10 PM  Patient seen and examined, reviewed most recent labs from routine physical done on August 8, 2023 when patient had a creatinine 1.1.  Presents today with intermittent pain of the right toe for couple of days but now fairly constant and severe.  On exam, mild swelling and redness at the MtP and IP joints of the great toe with exquisite pain with movement.  No known trauma.  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  Consider septic arthritis but waxing waning character of this would be unusual in appearance is more typical for gout.  Patient was started on prednisone and toradol.  Uric acid level and BMP sent  3:44 PM uric acid and basic panel are reassuring.  Stable for discharge.    At the conclusion of the encounter I discussed the results of all of the tests and the disposition. The questions were answered. The patient or family acknowledged understanding and was agreeable with the care plan.     Medical Decision Making    History:  Supplemental history from: Documented in chart, if applicable  External Record(s) reviewed: Documented in chart, if applicable.    Work Up:  Chart documentation includes differential considered and any EKGs or imaging independently interpreted by provider, where specified.  In additional to work up documented, I considered the following work up: Documented in chart, if applicable.    External consultation:  Discussion of management with another  provider: Documented in chart, if applicable    Complicating factors:  Care impacted by chronic illness: Hyperlipidemia and Other: Liver disease  Care affected by social determinants of health: Access to Affordable Health Care    Disposition considerations: Discharge. I prescribed additional prescription strength medication(s) as charted. N/A.    MEDICATIONS GIVEN IN THE EMERGENCY:  Medications   ketorolac (TORADOL) injection 15 mg (15 mg Intravenous $Given 11/12/23 1520)       NEW PRESCRIPTIONS STARTED AT TODAY'S ER VISIT  New Prescriptions    KETOROLAC (TORADOL) 10 MG TABLET    Take 1 tablet (10 mg) by mouth every 6 hours as needed for pain    PANTOPRAZOLE (PROTONIX) 40 MG EC TABLET    Take 1 tablet (40 mg) by mouth daily for 30 doses    PREDNISONE (DELTASONE) 10 MG TABLET    Take 4 tablets (40 mg) by mouth daily for 3 days, THEN 2 tablets (20 mg) daily for 3 days, THEN 1 tablet (10 mg) daily for 3 days, THEN 0.5 tablets (5 mg) daily for 2 days.       =================================================================    HPI    Patient information was obtained from: Patient      Jack Logan is a 43 year old male with a pertinent history of allergic rhinitis, hyperlipidemia, candidal Intertrigo, asthma, liver disease, onychomycosis, GERD, and appendectomy, who presents to this ED via walk-in for evaluation of toe pain.      Patient reports that he had a procedure done on his right toe when it was infected a couple years ago. Notes a week ago, he had one episode of right toe pain, but when he woke up the next morning, the pain subsided and then went away. Mentions that 2 night ago, he felt toe pain but noticed that the pain didn't go away and stayed. Later on in the day, he noticed the pain worsened, noting that he was wearing dress shoes and was walking a lot that day, and at night was able to sleep fine. He woke up yesterday morning (11/11), he felt some right toe pain, and then an hour later, he notes that his  "right toe started to swell up, then 1 and 1/2 hours later, he noticed swelling increased more. Describes the toe pain as initially vague throbbing, later feeling like he had a \"needle in his toe down to his bone\", noting now it feels like \"a balloon was getting blown up\" in his toe. He didn't ice toe, or take Tylenol or ibuprofen. He takes trazodone  regularly. Denies history of diabetes mellitus or hypertension. He has a history of seasonal allergies. No other reported complaints or concerns at this time.    REVIEW OF SYSTEMS   Review of Systems   Musculoskeletal:         Positive for great right toe pain   All other systems reviewed and are negative.     All other systems reviewed and negative    PAST MEDICAL HISTORY:  Past Medical History:   Diagnosis Date    ADHD (attention deficit hyperactivity disorder)     Allergic rhinitis, cause unspecified     Allergic rhinitis, cause unspecified     Anxiety     Anxiety     Asthma     Asthma     Autism spectrum disorder     Cystic fibrosis carrier     Cystic fibrosis carrier     Depression     Depression     Depressive disorder     For as long as I can remember    Gastroesophageal reflux disease     Hearing loss     Hearing loss     Liver disease     Motion sickness     Obesity     Obesity     Onychomycosis     Onychomycosis     Sleep apnea        PAST SURGICAL HISTORY:  Past Surgical History:   Procedure Laterality Date    ABDOMEN SURGERY      Appendix    APPENDECTOMY      BIOPSY      Skin tissue for melenoma test    EXCISE TOENAIL(S) Right 12/20/2021    Procedure: Matrixectomy digits one, four and five right foot;  Surgeon: Yung Merchant DPM;  Location: [facility]    OTHER SURGICAL HISTORY      atypical nevus    WISDOM TOOTH EXTRACTION         CURRENT MEDICATIONS:    No current facility-administered medications for this encounter.     Current Outpatient Medications   Medication    ketorolac (TORADOL) 10 MG tablet    pantoprazole (PROTONIX) 40 MG EC tablet "    predniSONE (DELTASONE) 10 MG tablet    albuterol (PROAIR HFA/PROVENTIL HFA/VENTOLIN HFA) 108 (90 Base) MCG/ACT inhaler    albuterol (PROAIR HFA;PROVENTIL HFA;VENTOLIN HFA) 90 mcg/actuation inhaler    amphetamine-dextroamphetamine (ADDERALL XR) 10 MG 24 hr capsule    amphetamine-dextroamphetamine (ADDERALL XR) 10 MG 24 hr capsule    cetirizine (ZYRTEC) 10 MG tablet    fluticasone (FLONASE) 50 mcg/actuation nasal spray    PARoxetine (PAXIL) 40 MG tablet    PARoxetine (PAXIL) 40 MG tablet    traZODone (DESYREL) 50 MG tablet    traZODone (DESYREL) 50 MG tablet       ALLERGIES:  Allergies   Allergen Reactions    Cats Hives and Shortness Of Breath    Dust Mite Extract Cough and Itching    Pollen Extract Rash       FAMILY HISTORY:  Family History   Problem Relation Age of Onset    Melanoma Mother     Autism Spectrum Disorder Mother     Depression Mother     Anxiety Disorder Mother     Asthma Mother     Bipolar Disorder Mother     Chronic Obstructive Pulmonary Disease Mother     Bipolar Disorder Father     Cancer Father 52        nasopharyngeal    Diabetes Father     Hypertension Father     Hyperlipidemia Father     Depression Father     Anxiety Disorder Father     Obesity Father     Pancreatic Cancer Father     Pancreatic Cancer Maternal Grandmother     Parkinsonism Maternal Grandmother     Thrombocytopenia Maternal Grandmother     Unknown/Adopted Maternal Grandfather     Glaucoma Paternal Grandmother     Pancreatic Cancer Paternal Grandmother         diagnosed in her 60s-70s    Melanoma Paternal Grandmother         diagnosed in her 60s-70s    Pancreatic Cancer Paternal Grandfather         possible diagnosis    Diabetes Paternal Grandfather     Hypertension Paternal Grandfather     Hyperlipidemia Paternal Grandfather     Cystic Fibrosis Daughter     Pancreatic Cancer Paternal Aunt         diagnosed in her 50s-60s    Cancer Paternal Uncle         nasopharyngeal cancer, diagnosed in his 50s-60s    Colon Cancer Cousin       "   paternal first-cousin, prior to age 50    Leukemia Cousin         paternal first-cousin, in childhood    Pancreatic Cancer Other     Stomach Cancer Other         great-aunt (paternal grandmother's sister)       SOCIAL HISTORY:   Social History     Socioeconomic History    Marital status: Legally     Number of children: 1    Highest education level: Associate degree: occupational, technical, or vocational program   Tobacco Use    Smoking status: Never    Smokeless tobacco: Never   Substance and Sexual Activity    Alcohol use: Yes     Alcohol/week: 2.5 standard drinks of alcohol     Comment: Alcoholic Drinks/day: Social     Drug use: No    Sexual activity: Yes     Partners: Female   Other Topics Concern    Parent/sibling w/ CABG, MI or angioplasty before 65F 55M? No       VITALS:  BP (!) 157/97   Pulse 88   Temp 98.9  F (37.2  C) (Oral)   Resp 18   Ht 1.753 m (5' 9\")   Wt 104.3 kg (230 lb)   SpO2 97%   BMI 33.97 kg/m      PHYSICAL EXAM:  Physical Exam  Constitutional:       General: He is not in acute distress.     Appearance: He is well-developed. He is not diaphoretic.   HENT:      Head: Normocephalic and atraumatic.      Nose: No congestion.      Mouth/Throat:      Mouth: Mucous membranes are moist.   Eyes:      General: No scleral icterus.     Conjunctiva/sclera: Conjunctivae normal.   Cardiovascular:      Rate and Rhythm: Normal rate.   Pulmonary:      Effort: Pulmonary effort is normal.   Abdominal:      General: Abdomen is flat.   Musculoskeletal:      Cervical back: Normal range of motion and neck supple.      Right foot: Tenderness (Exquisite tenderness of right toe) present.      Comments: Erythema of the MTP and IP joint of the right great toe. Exquisite tenderness and pain with passive movement.   Skin:     General: Skin is warm and dry.      Capillary Refill: Capillary refill takes less than 2 seconds.      Findings: No rash.   Neurological:      Mental Status: He is alert and oriented " to person, place, and time.          LAB:  All pertinent labs reviewed and interpreted.  Results for orders placed or performed during the hospital encounter of 11/12/23   Basic metabolic panel   Result Value Ref Range    Sodium 140 135 - 145 mmol/L    Potassium 4.6 3.4 - 5.3 mmol/L    Chloride 103 98 - 107 mmol/L    Carbon Dioxide (CO2) 24 22 - 29 mmol/L    Anion Gap 13 7 - 15 mmol/L    Urea Nitrogen 10.1 6.0 - 20.0 mg/dL    Creatinine 0.87 0.67 - 1.17 mg/dL    GFR Estimate >90 >60 mL/min/1.73m2    Calcium 9.6 8.6 - 10.0 mg/dL    Glucose 72 70 - 99 mg/dL   Result Value Ref Range    Uric Acid 6.0 3.4 - 7.0 mg/dL       RADIOLOGY:  Reviewed all pertinent imaging. Please see official radiology report.  No orders to display       I, Lisa Brown, am serving as a scribe to document services personally performed by Dr. Madrid based on my observation and the provider's statements to me. I, Marshall Madrid MD attest that Lisa Brown is acting in a scribe capacity, has observed my performance of the services and has documented them in accordance with my direction.    Marshall Madrid M.D.  Emergency Medicine  [facility] EMERGENCY ROOM  [address] [id number] [phone]  Dept: [phone]       Marshall Madrid MD  11/12/23 9568

## 2023-12-24 ENCOUNTER — HEALTH MAINTENANCE LETTER (OUTPATIENT)
Age: 43
End: 2023-12-24

## 2025-01-18 ENCOUNTER — HEALTH MAINTENANCE LETTER (OUTPATIENT)
Age: 45
End: 2025-01-18

## 2025-03-19 ENCOUNTER — HOSPITAL ENCOUNTER (EMERGENCY)
Facility: CLINIC | Age: 45
Discharge: HOME | End: 2025-03-19
Attending: EMERGENCY MEDICINE | Admitting: EMERGENCY MEDICINE
Payer: COMMERCIAL

## 2025-03-19 VITALS
SYSTOLIC BLOOD PRESSURE: 138 MMHG | RESPIRATION RATE: 18 BRPM | HEIGHT: 69 IN | WEIGHT: 205 LBS | BODY MASS INDEX: 30.36 KG/M2 | DIASTOLIC BLOOD PRESSURE: 98 MMHG | HEART RATE: 66 BPM | OXYGEN SATURATION: 100 % | TEMPERATURE: 98.7 F

## 2025-03-19 DIAGNOSIS — J06.9 VIRAL URI WITH COUGH: ICD-10-CM

## 2025-03-19 DIAGNOSIS — R07.89 CHEST WALL PAIN: ICD-10-CM

## 2025-03-19 LAB
ANION GAP SERPL CALCULATED.3IONS-SCNC: 10 MMOL/L (ref 7–15)
BASOPHILS # BLD AUTO: 0 10E3/UL (ref 0–0.2)
BASOPHILS NFR BLD AUTO: 0 %
BUN SERPL-MCNC: 18 MG/DL (ref 6–20)
CALCIUM SERPL-MCNC: 9.7 MG/DL (ref 8.8–10.4)
CHLORIDE SERPL-SCNC: 107 MMOL/L (ref 98–107)
CREAT SERPL-MCNC: 0.77 MG/DL (ref 0.67–1.17)
EGFRCR SERPLBLD CKD-EPI 2021: >90 ML/MIN/1.73M2
EOSINOPHIL # BLD AUTO: 0.1 10E3/UL (ref 0–0.7)
EOSINOPHIL NFR BLD AUTO: 2 %
ERYTHROCYTE [DISTWIDTH] IN BLOOD BY AUTOMATED COUNT: 13.6 % (ref 10–15)
FLUAV RNA SPEC QL NAA+PROBE: NEGATIVE
FLUBV RNA RESP QL NAA+PROBE: NEGATIVE
GLUCOSE SERPL-MCNC: 84 MG/DL (ref 70–99)
HCO3 SERPL-SCNC: 23 MMOL/L (ref 22–29)
HCT VFR BLD AUTO: 42.9 % (ref 40–53)
HGB BLD-MCNC: 14.2 G/DL (ref 13.3–17.7)
IMM GRANULOCYTES # BLD: 0 10E3/UL
IMM GRANULOCYTES NFR BLD: 0 %
LYMPHOCYTES # BLD AUTO: 1.7 10E3/UL (ref 0.8–5.3)
LYMPHOCYTES NFR BLD AUTO: 19 %
MCH RBC QN AUTO: 29.2 PG (ref 26.5–33)
MCHC RBC AUTO-ENTMCNC: 33.1 G/DL (ref 31.5–36.5)
MCV RBC AUTO: 88 FL (ref 78–100)
MONOCYTES # BLD AUTO: 0.7 10E3/UL (ref 0–1.3)
MONOCYTES NFR BLD AUTO: 8 %
NEUTROPHILS # BLD AUTO: 6.4 10E3/UL (ref 1.6–8.3)
NEUTROPHILS NFR BLD AUTO: 71 %
NRBC # BLD AUTO: 0 10E3/UL
NRBC BLD AUTO-RTO: 0 /100
PLATELET # BLD AUTO: 121 10E3/UL (ref 150–450)
POTASSIUM SERPL-SCNC: 5.9 MMOL/L (ref 3.4–5.3)
RBC # BLD AUTO: 4.86 10E6/UL (ref 4.4–5.9)
RSV RNA SPEC NAA+PROBE: NEGATIVE
S PYO DNA THROAT QL NAA+PROBE: NOT DETECTED
SARS-COV-2 RNA RESP QL NAA+PROBE: NEGATIVE
SODIUM SERPL-SCNC: 140 MMOL/L (ref 135–145)
TROPONIN T SERPL HS-MCNC: <6 NG/L
WBC # BLD AUTO: 9.1 10E3/UL (ref 4–11)

## 2025-03-19 PROCEDURE — 96360 HYDRATION IV INFUSION INIT: CPT

## 2025-03-19 PROCEDURE — 96361 HYDRATE IV INFUSION ADD-ON: CPT

## 2025-03-19 PROCEDURE — 258N000003 HC RX IP 258 OP 636: Performed by: EMERGENCY MEDICINE

## 2025-03-19 PROCEDURE — 99284 EMERGENCY DEPT VISIT MOD MDM: CPT | Mod: 25

## 2025-03-19 RX ADMIN — SODIUM CHLORIDE 1000 ML: 0.9 INJECTION, SOLUTION INTRAVENOUS at 17:40

## 2025-03-19 ASSESSMENT — ACTIVITIES OF DAILY LIVING (ADL)
ADLS_ACUITY_SCORE: 41

## 2025-03-19 NOTE — ED TRIAGE NOTES
Pt presents to the ED with c/o flu symptoms. Pt went to [facility] and was told to come here and get the RSV/FLU/COVID swab. Pt admits to SOB with activity and is very fatigued. No vomiting No diarrhea.

## 2025-03-19 NOTE — ED PROVIDER NOTES
EMERGENCY DEPARTMENT ENCOUNTER      NAME: Jack Logan  AGE: 44 year old male  YOB: 1980  MRN: [id number]  EVALUATION DATE & TIME: No admission date for patient encounter.    PCP: [facility], [facility]    ED PROVIDER: Bret Garland M.D.      Chief Complaint   Patient presents with    Flu Symptoms         FINAL IMPRESSION:  1.  Acute viral URI.  2.  Acute chest wall pain.      ED COURSE & MEDICAL DECISION MAKIN:13 PM.  I met with the patient to gather history and to perform my initial exam. We discussed plans for the ED course, including diagnostic testing and treatment. PPE worn: cloth mask.  Patient notes several days of flu symptoms, generalized achiness, fever, chills, nonproductive cough, chest heaviness and reproducible pain along the left sternal border with palpation.  Seen in urgent care and sent here for further evaluation.  He notes an EKG was done there which was read as negative that the physician told him.  7:49 PM.  Viral testing negative.  Strep test negative.  Chemistries negative other than potassium 5.9 but hemolyzed.  Troponin negative.  Chest x-ray negative.  Strep test negative.  Patient received a liter of IV fluids.  Findings consistent with a viral upper respiratory infection and chest wall pain.  Patient we discharged to home.  Patient in agreement.    Pertinent Labs & Imaging studies reviewed. (See chart for details)  44 year old male presents to the Emergency Department for evaluation of cold and flu symptoms.    At the conclusion of the encounter I discussed the results of all of the tests and the disposition. The questions were answered. The patient or family acknowledged understanding and was agreeable with the care plan.              Medical Decision Making  Obtained supplemental history: Emergency room clinic visit reviewed.  Reviewed external records: Both inpatient and inpatient computer records were reviewed.  Care impacted by chronic  illness: ADHD, anxiety, Asperger syndrome.  Did you consider but not order tests?: Work up considered but not performed and documented in chart, if applicable  Did you interpret images independently?: Independent interpretation of ECG and images noted in documentation, when applicable.  Consultation discussion with other provider:Did you involve another provider (consultant, , pharmacy, etc.)?: No  Anticipate discharge home after evaluation.    MIPS (CTPE, Dental pain, Ordoñez, Sinusitis, Asthma/COPD, Head Trauma): Not Applicable    SEPSIS: None          MEDICATIONS GIVEN IN THE EMERGENCY:  Medications   sodium chloride 0.9% BOLUS 1,000 mL (has no administration in time range)       NEW PRESCRIPTIONS STARTED AT TODAY'S ER VISIT  New Prescriptions    No medications on file          =================================================================    HPI    Patient information was obtained from: The patient.    Use of : N/A         Jack Logan is a 44 year old male with a pertinent history of ADHD, asthma, Asperger syndrome who presents to this ED today for evaluation of several days of cold and flu symptoms, congestion, nonproductive cough, chest heaviness is reproducible pain along the left sternal border.  Seen in urgent care clinic.  Told his EKG was unremarkable and sent here for further evaluation.    He does not identify any waxing or waning symptoms otherwise, exacerbating or alleviating features, associated symptoms except as mentioned.  He otherwise denies any pain related complaints.    REVIEW OF SYSTEMS   Review of Systems patient complaining of congestion, nonproductive cough, chest wall pain.    PAST MEDICAL HISTORY:  Past Medical History:   Diagnosis Date    ADHD (attention deficit hyperactivity disorder)     Allergic rhinitis, cause unspecified     Allergic rhinitis, cause unspecified     Anxiety     Anxiety     Asthma     Asthma     Autism spectrum disorder     Cystic fibrosis carrier      Cystic fibrosis carrier     Depression     Depression     Depressive disorder     For as long as I can remember    Gastroesophageal reflux disease     Hearing loss     Hearing loss     Liver disease     Motion sickness     Obesity     Obesity     Onychomycosis     Onychomycosis     Sleep apnea        PAST SURGICAL HISTORY:  Past Surgical History:   Procedure Laterality Date    ABDOMEN SURGERY      Appendix    APPENDECTOMY      BIOPSY      Skin tissue for melenoma test    EXCISE TOENAIL(S) Right 12/20/2021    Procedure: Matrixectomy digits one, four and five right foot;  Surgeon: Yung Merchant DPM;  Location: [facility]    OTHER SURGICAL HISTORY      atypical nevus    WISDOM TOOTH EXTRACTION             CURRENT MEDICATIONS:    albuterol (PROAIR HFA/PROVENTIL HFA/VENTOLIN HFA) 108 (90 Base) MCG/ACT inhaler  albuterol (PROAIR HFA;PROVENTIL HFA;VENTOLIN HFA) 90 mcg/actuation inhaler  amphetamine-dextroamphetamine (ADDERALL XR) 10 MG 24 hr capsule  amphetamine-dextroamphetamine (ADDERALL XR) 10 MG 24 hr capsule  cetirizine (ZYRTEC) 10 MG tablet  fluticasone (FLONASE) 50 mcg/actuation nasal spray  ketorolac (TORADOL) 10 MG tablet  PARoxetine (PAXIL) 40 MG tablet  PARoxetine (PAXIL) 40 MG tablet  traZODone (DESYREL) 50 MG tablet  traZODone (DESYREL) 50 MG tablet        ALLERGIES:  Allergies   Allergen Reactions    Cats Hives and Shortness Of Breath    Dust Mite Extract Cough and Itching    Pollen Extract Rash       FAMILY HISTORY:  Family History   Problem Relation Age of Onset    Melanoma Mother     Autism Spectrum Disorder Mother     Depression Mother     Anxiety Disorder Mother     Asthma Mother     Bipolar Disorder Mother     Chronic Obstructive Pulmonary Disease Mother     Bipolar Disorder Father     Cancer Father 52        nasopharyngeal    Diabetes Father     Hypertension Father     Hyperlipidemia Father     Depression Father     Anxiety Disorder Father     Obesity Father     Pancreatic Cancer  "Father     Pancreatic Cancer Maternal Grandmother     Parkinsonism Maternal Grandmother     Thrombocytopenia Maternal Grandmother     Unknown/Adopted Maternal Grandfather     Glaucoma Paternal Grandmother     Pancreatic Cancer Paternal Grandmother         diagnosed in her 60s-70s    Melanoma Paternal Grandmother         diagnosed in her 60s-70s    Pancreatic Cancer Paternal Grandfather         possible diagnosis    Diabetes Paternal Grandfather     Hypertension Paternal Grandfather     Hyperlipidemia Paternal Grandfather     Cystic Fibrosis Daughter     Pancreatic Cancer Paternal Aunt         diagnosed in her 50s-60s    Cancer Paternal Uncle         nasopharyngeal cancer, diagnosed in his 50s-60s    Colon Cancer Cousin         paternal first-cousin, prior to age 50    Leukemia Cousin         paternal first-cousin, in childhood    Pancreatic Cancer Other     Stomach Cancer Other         great-aunt (paternal grandmother's sister)       SOCIAL HISTORY:   Social History     Socioeconomic History    Marital status:     Number of children: 1    Highest education level: Associate degree: occupational, technical, or vocational program   Tobacco Use    Smoking status: Never    Smokeless tobacco: Never   Substance and Sexual Activity    Alcohol use: Yes     Alcohol/week: 2.5 standard drinks of alcohol     Comment: Alcoholic Drinks/day: Social     Drug use: No    Sexual activity: Yes     Partners: Female   Other Topics Concern    Parent/sibling w/ CABG, MI or angioplasty before 65F 55M? No     Occasional alcohol.  Quit for 5 years ago.  No current drugs, alcohol, or tobacco.    VITALS:  BP (!) 147/92   Pulse 65   Temp 98.7  F (37.1  C) (Oral)   Resp 18   Ht 1.753 m (5' 9\")   Wt 93 kg (205 lb)   SpO2 100%   BMI 30.27 kg/m      PHYSICAL EXAM    Vital Signs:  BP (!) 147/92   Pulse 65   Temp 98.7  F (37.1  C) (Oral)   Resp 18   Ht 1.753 m (5' 9\")   Wt 93 kg (205 lb)   SpO2 100%   BMI 30.27 kg/m    General:  " On entering the room he is in no apparent distress.    Neck:  Neck supple with full range of motion and nontender.    Back:  Back and spine are nontender.  No costovertebral angle tenderness.    HEENT:  Oropharynx slightly red and swollen without exudate and with moist mucous membranes.  HEENT unremarkable.    Pulmonary:  Chest clear to auscultation without rhonchi rales or wheezing.  Pain reproducible with palpation along the left sternal border.  Cardiovascular:  Cardiac regular rate and rhythm without murmurs rubs or gallops.    Abdomen:  Abdomen soft nontender.  There is no rebound or guarding.    Muskuloskeletal:  He moves all 4 without any difficulty and has normal neurovascular exams.  Extremities without clubbing, cyanosis, or edema.  Legs and calves are nontender.    Neuro:  He is alert and oriented ×3 and moves all extremities symmetrically.    Psych:  Normal affect.    Skin:  Unremarkable and warm and dry.       LAB:  All pertinent labs reviewed and interpreted.  Labs Ordered and Resulted from Time of ED Arrival to Time of ED Departure   BASIC METABOLIC PANEL - Abnormal       Result Value    Sodium 140      Potassium 5.9 (*)     Chloride 107      Carbon Dioxide (CO2) 23      Anion Gap 10      Urea Nitrogen 18.0      Creatinine 0.77      GFR Estimate >90      Calcium 9.7      Glucose 84     CBC WITH PLATELETS AND DIFFERENTIAL - Abnormal    WBC Count 9.1      RBC Count 4.86      Hemoglobin 14.2      Hematocrit 42.9      MCV 88      MCH 29.2      MCHC 33.1      RDW 13.6      Platelet Count 121 (*)     % Neutrophils 71      % Lymphocytes 19      % Monocytes 8      % Eosinophils 2      % Basophils 0      % Immature Granulocytes 0      NRBCs per 100 WBC 0      Absolute Neutrophils 6.4      Absolute Lymphocytes 1.7      Absolute Monocytes 0.7      Absolute Eosinophils 0.1      Absolute Basophils 0.0      Absolute Immature Granulocytes 0.0      Absolute NRBCs 0.0     INFLUENZA A/B, RSV AND SARS-COV2 PCR - Normal     Influenza A PCR Negative      Influenza B PCR Negative      RSV PCR Negative      SARS CoV2 PCR Negative     TROPONIN T, HIGH SENSITIVITY - Normal    Troponin T, High Sensitivity <6     GROUP A STREPTOCOCCUS PCR THROAT SWAB - Normal    Group A strep by PCR Not Detected         RADIOLOGY:  Reviewed all pertinent imaging. Please see official radiology report.  XR Chest 2 Views   Final Result   IMPRESSION: Cardiac silhouette size is within normal limits. Lungs are clear. Bridging thoracic syndesmophytes.                 EKG:    Patient notes that his doctor told him his EKG at the office was negative.  Does not want a second EKG here.        Bret Garland M.D.  Emergency Medicine  [facility] [place], [place] EMERGENCY ROOM  [address] [place] [id number] [phone]  Dept: [phone]       Bret Garland MD  03/19/25 1950

## 2025-03-19 NOTE — Clinical Note
Jack Logan was seen and treated in our emergency department on 3/19/2025.  He may return to work on 03/24/2025.  Patient seen in the emergency department today, March 19, on Wednesday.  Off work Friday.     If you have any questions or concerns, please don't hesitate to call.      Bret Garland MD

## 2025-03-20 NOTE — DISCHARGE INSTRUCTIONS
Encourage rest and fluids.  Tylenol or ibuprofen as needed.  Cold medication such as Mucinex or Benadryl or Zyrtec may help.  Negative cardiac evaluation including EKG and troponin.  If problems persist, follow-up with your family doctor or clinic in the next 1 to 2 weeks.  See them sooner if worse or problems or concerns.

## 2025-04-06 ENCOUNTER — HEALTH MAINTENANCE LETTER (OUTPATIENT)
Age: 45
End: 2025-04-06